# Patient Record
Sex: MALE | Race: BLACK OR AFRICAN AMERICAN | Employment: UNEMPLOYED | URBAN - METROPOLITAN AREA
[De-identification: names, ages, dates, MRNs, and addresses within clinical notes are randomized per-mention and may not be internally consistent; named-entity substitution may affect disease eponyms.]

---

## 2020-01-13 PROBLEM — F17.200 SMOKER: Status: ACTIVE | Noted: 2020-01-13

## 2020-01-13 PROBLEM — C61 MALIGNANT TUMOR OF PROSTATE (HCC): Status: ACTIVE | Noted: 2019-11-21

## 2020-01-13 PROBLEM — N45.3 ORCHITIS AND EPIDIDYMITIS: Status: ACTIVE | Noted: 2020-01-13

## 2020-01-13 PROBLEM — I10 ESSENTIAL HYPERTENSION: Status: ACTIVE | Noted: 2017-04-18

## 2022-07-06 ENCOUNTER — OFFICE VISIT (OUTPATIENT)
Dept: ORTHOPEDIC SURGERY | Age: 62
End: 2022-07-06
Payer: MEDICAID

## 2022-07-06 VITALS
HEIGHT: 67 IN | TEMPERATURE: 97.9 F | BODY MASS INDEX: 22.76 KG/M2 | OXYGEN SATURATION: 98 % | HEART RATE: 81 BPM | WEIGHT: 145 LBS

## 2022-07-06 DIAGNOSIS — M54.41 CHRONIC BILATERAL LOW BACK PAIN WITH BILATERAL SCIATICA: Primary | ICD-10-CM

## 2022-07-06 DIAGNOSIS — M54.42 CHRONIC BILATERAL LOW BACK PAIN WITH BILATERAL SCIATICA: Primary | ICD-10-CM

## 2022-07-06 DIAGNOSIS — G89.29 CHRONIC BILATERAL LOW BACK PAIN WITH BILATERAL SCIATICA: Primary | ICD-10-CM

## 2022-07-06 PROCEDURE — 99204 OFFICE O/P NEW MOD 45 MIN: CPT | Performed by: PHYSICAL MEDICINE & REHABILITATION

## 2022-07-06 RX ORDER — METHOCARBAMOL 500 MG/1
TABLET, FILM COATED ORAL
COMMUNITY
End: 2022-07-06

## 2022-07-06 RX ORDER — AMLODIPINE BESYLATE 5 MG/1
TABLET ORAL
COMMUNITY
Start: 2022-07-05

## 2022-07-06 RX ORDER — DICLOFENAC SODIUM 10 MG/G
GEL TOPICAL
COMMUNITY
Start: 2022-07-05 | End: 2022-08-04

## 2022-07-06 NOTE — PROGRESS NOTES
[address]  [address], [address]  [place], [address] [place] [address] Street  Phone: [phone]  Fax: [phone]      Patient: Kathrine Edwards                                                                              MRN: [id number]        YOB: 1960          AGE: 62 y.o. PCP: Barbara Adam MD  Date:  07/06/22    Reason for Consultation: Back Pain and Neck Pain      HPI:  Kathrine Edwards is a 62 y.o. male with relevant PMH of prostate cancer (holistic treatment per patients request) , HTN who presents with low back pain. The pain began in 1997 after an injury carrying heavy material up stairs, felt pain in the right hip hip and weakness in the right leg. At the time did physical therapy. Over the years has had intermittent pain in the low back and right hip. 1.5 years ago pain worsened. He was seen by Dr. Meet Nagel and tried epidural injections which he found helpful. Neurologic symptoms: No numbness, tingling, weakness, bowel or bladder changes. Reports a fall 1/2022. Ambulates with cane      Location: The pain is located in the low back, buttock, right lateral hip   Radiation: The pain does radiate down the right leg . Pain Score: Currently: 2/10  At Best: 0/10  At Worst: 10/10   Quality: Pain is of a Achy, Dull and Tight quality. Aggravating: Pain is exacerbated by unclear  Alleviating: The pain is alleviated by lying down    Prior Treatments:  Physical therapy: None recently, last in [facility] - series of 3 injections helped   Chiropractic treatment  Previous Medications: Aleve prn  Current Medications:  Previous work-up has included:   MRI cervical spine 2020  C1-2:  Within normal limits. C2-3:  Normal disc height and hydration.  Minimal disc bulging.  Mild right facet joint arthrosis.  Anterior-posterior dimension of the thecal sac at the midline measures 11 mm. C3-4:  Mild disc narrowing and desiccation.  Mild-moderate diffuse disc bulging with accompanying osteophytes.  Severe bilateral uncovertebral joint hypertrophy.  Anterior-posterior dimension of the thecal sac at the midline measures 11 mm. C4-5:  Normal disc height.  Mild disc desiccation.  Mild bilateral uncovertebral joint hypertrophy. Anterior-posterior dimension of the thecal sac at the midline measures 10.5 mm. C5-6:  Mild disc narrowing and desiccation.  Mild left disc bulging with accompanying osteophytes.  Mild right and moderate left uncovertebral joint hypertrophy.  Moderate left neural foraminal stenosis.  Anterior-posterior dimension of the thecal sac at the midline measures 10.5 mm.     C6-7:  Moderate disc narrowing and desiccation.  Mild diffuse disc bulging.  Anterior-posterior dimension of the thecal sac at the midline measures 11 mm. C7-T1:  Normal disc height and hydration.  Moderate bilateral facet joint arthrosis with 1.5 mm anterior subluxation.  Anterior-posterior dimension of the thecal sac at the midline measures 11 mm.    2/2021- x-ray hip right   Pelvis: No evidence of acute fracture of the pelvis. The sacroiliac joints are unremarkable. The pubic symphysis is unremarkable.   No lytic or blastic focus identified. No erosions or periostitis appreciated. Hip: The femoral heads are well aligned with the osseous pelvis. No evidence of right hip fracture or dislocation.  The joint spaces are unremarkable. 2/22/21- x-ray lumbar spine  VERTEBRAE AND ALIGNMENT: There is degenerative subluxation of L4 on L5 of approximately 0.9 cm     DISC SPACES: Disc space narrowing is present at L4-5 L2-3 and L1-2 severity is moderate. PARASPINOUS SOFT TISSUES: Unremarkable. ADDITIONAL: None.      IMPRESSION     Degenerative subluxation of L4 on 5 with multilevel degenerative disc dis  Bone scan 10/14/2020    There is increased radiotracer localization seen at the cervicothoracic junction as compared to previous study nonspecific although presumably is on the basis of disc degenerative disease.  An MR examination of cervical spine is recommended to fully exclude the remote possibility of a metastatic process in this regard. Past Medical History:   Past Medical History:   Diagnosis Date    Elevated PSA     HTN (hypertension)     Orchitis and epididymitis     [facility] ([facility] [address]     Smoker     Vitamin D deficiency       Past Surgical History:   Past Surgical History:   Procedure Laterality Date    HX HERNIA REPAIR      inguinal     HX LAPAROTOMY      removal of benign tumor in abdomen       SocHx:   Social History     Tobacco Use    Smoking status: Current Every Day Smoker     Packs/day: 0.50     Years: 20.00     Pack years: 10.00     Types: Cigarettes    Smokeless tobacco: Never Used   Substance Use Topics    Alcohol use: Yes     Alcohol/week: 6.0 standard drinks     Types: 6 Standard drinks or equivalent per week      FamHx:?    Family History   Family history unknown: Yes       Current Medications:    Current Outpatient Medications   Medication Sig Dispense Refill    amLODIPine (NORVASC) 5 mg tablet       diclofenac (VOLTAREN) 1 % gel       methocarbamoL (ROBAXIN) 500 mg tablet methocarbamol 500 mg tablet   TAKE 1 TABLET BY MOUTH 2 TIMES DAILY AS NEEDED (Patient not taking: Reported on 7/6/2022)      HYDROcodone-acetaminophen (NORCO) 5-325 mg per tablet TK 1 T PO Q 4 H FOR 3 DAYS (Patient not taking: Reported on 7/6/2022)        Allergies:  No Known Allergies     Review of Systems:   Gen:    Denied fevers, chills, malaise, fatigue, weight changes   Resp: Denied shortness of breath, cough, wheezing   CVS: Denied chest pain, palpitations   : Denied urinary urgency, frequency, incontinence   GI: Denied nausea, vomiting, constipation, diarrhea   Skin: Denied rashes, wounds   Psych: Denied anxiety, depression   Vasc: Denied claudication, ulcers   Hem: Denied easy bruising/bleeding   MSK: See HPI   Neuro: See HPI         Physical Exam     Vital Signs:   Visit Vitals  Pulse 81   Temp 97.9 °F (36.6 °C) (Temporal)   Ht 5' 7\" (1.702 m)   Wt 145 lb (65.8 kg)   SpO2 98%   BMI 22.71 kg/m²      General: ??????? Well nourished and well developed male without any acute distress   Psychiatric: ?  Alert and oriented x 3 with normal mood    HEENT: ???????? Atraumatic   Respiratory:   Breathing non-labored and non dyspneic   CV: ???????????????? Peripheral pulses intact, no peripheral edema   Skin: ????????????? No rashes       Neurologic: ?? Sensation: normal and grossly intact thebilateral, lower extremity(s)  Strength: 5/5 in the bilateral, lower extremity(s)   Reflexes: reveals 2+ symmetric DTRs throughout  LE  Gait: normal    Musculoskeletal: Lumbar Exam     Inspection:   Alignment: Normal  Atrophy: None       Tenderness to Palpation:   Lumbar paraspinals Positive  Lumbar spinous processes Negative  SI Joint:  Positive  Gluteal:Negative   Greater trochanter: Negative      ROM:   Lumbar ROM: Normal  Lumbar facet loading: Negative  Hip ROM: No reproduction of pain with movement     Special Tests      Slump test: Negative  SLR: Negative  HARRIETT: Negative  FADIR: Negative  Scour:  Negative  Stinchfield: Negative  Log Roll: Negative  SI joint compression: Negative        Medical Decision Making:    Images: The imaging results as well as the actual images of the studies below were reviewed, visualized and interpreted by me. Labs: The results below were reviewed. Bone scan 2020- increased localization in cervicothoracic junction  MRI cervical spine 2020- multilevel DDD- no evidence of metastatic disease  Reviewed prior urology notes - Dr. Rachel Christianson regarding prostate cancer     Assessment:   -chronic low back pain    Plan:      -Physical therapy -  Referral to start PT  -Medications - NA patient prefers to avoid medicatons.  Counseled regarding side effects and appropriate administration of medications.    -Diagnostics/Imaging - He thinks he had an MRI of his lumbar spine at [facility]- called and did not have record of MRI. He is going to bring his MRI CD and reports from home   -Injections - Consider repeat epidural injection  -Lifestyle - Encouraged regular aerobic exercise   -Education - The patient's diagnosis, prognosis and treatment options were discussed today. All questions were answered.    F/U - in 8 weeks, if he has not had recent MRI lumbar spine would get one      [address] [facility]

## 2022-07-06 NOTE — PROGRESS NOTES
Scottie Carney presents today for   Chief Complaint   Patient presents with    Back Pain    Neck Pain       Is someone accompanying this pt? no    Is the patient using any DME equipment during OV? no    Depression Screening:  No flowsheet data found. Learning Assessment:  No flowsheet data found. Abuse Screening:  No flowsheet data found. Fall Risk  No flowsheet data found. OPIOID RISK TOOL  No flowsheet data found. Coordination of Care:  1. Have you been to the ER, urgent care clinic since your last visit? no  Hospitalized since your last visit? no    2. Have you seen or consulted any other health care providers outside of the [address] [place] [address] since your last visit? no Include any pap smears or colon screening.  no

## 2022-07-14 ENCOUNTER — HOSPITAL ENCOUNTER (OUTPATIENT)
Dept: PHYSICAL THERAPY | Age: 62
Discharge: HOME | End: 2022-07-14
Payer: MEDICAID

## 2022-07-14 PROCEDURE — 97162 PT EVAL MOD COMPLEX 30 MIN: CPT

## 2022-07-14 NOTE — THERAPY EVALUATION
[address] [place] [address] PHYSICAL THERAPY  [address] [place] [address] [address], [place], [address] - Phone: [phone]  Fax: [phone] / [facility]  Patient Name: Daniel Grayson : 1960   Medical   Diagnosis: Other low back pain [M54.59] Treatment Diagnosis: Other low back pain [M54.9]   Onset Date:      Referral Source: Antonella Swanson [place] [facility]): 2022   Prior Hospitalization: See medical history Provider #: [id number]   Prior Level of Function: Independent with ADLs, ambulation; working at [employer]   Comorbidities: untreated active prostate cancer, arthritis, HTN, asthma, insomnia, neck pain, chronic back pain, h/o abdominal surgery (hernia repair, benign stomach tumor removal)   Medications: Verified on Patient Summary List   The Plan of Care and following information is based on the information from the initial evaluation.   ===========================================================================================  Assessment / key information:  Patient is a 62 y.o. male who presents with complaints of chronic LBP (10/10 at worst) with radicular symptoms, which began in . Patient reports that symptoms were primarily right-sided initially, but have been left-sided over the past couple of years. Patient demonstrates impaired posture, pain with L/S ROM, decreased core/LE strength, decreased position/activity tolerance and impaired functional mobility/gait. Patient would benefit from skilled PT services to address these issues and improve function.   Thank you for this referral.    L/S Active Movements:   ROM % AROM % PROM Comments:pain, area   Forward flexion 40-60 WFL   LBP, worse with return to standing from flexion than with flexion   Extension 20-30 WFL   NE   SB right 20-30 WFL   Left LBP,    SB left 20-30 WFL   Left LBP (worse than with right SB)   Rotation right 5-10 WFL   Left LBP Rotation left 5-10 WFL   NE      Strength    L(0-5) R (0-5) N/T   Hip Flexion (L1,2) 4- 4 []?    Knee Extension (L3,4) 5 4 []?    Ankle Dorsiflexion (L4) 4 4 []?    Great Toe Extension (L5) 4 4 []?    Ankle Plantarflexion (S1) 4 4 []?    Knee Flexion (S1,2) 5 4 []?    Upper Abdominals     [x]?     Lower Abdominals     [x]?    Paraspinals     [x]?     Back Rotators     [x]?    Gluteus Jerome 4- 4 []?    Other - Gluteus Medius 5 4- []?       FOTO: 48/100, stage 3, moderate difficulty performing usual work or household activities and hobbies  ==========================================================================================  Eval Complexity: History: HIGH Complexity :3+ comorbidities / personal factors will impact the outcome/ POC Exam:HIGH Complexity : 4+ Standardized tests and measures addressing body structure, function, activity limitation and / or participation in recreation  Presentation: MEDIUM Complexity : Evolving with changing characteristics  Clinical Decision Making:MEDIUM Complexity : FOTO score of 26-74Overall Complexity:MEDIUM    Problem List: pain affecting function, decrease ROM, decrease strength, impaired gait/ balance, decrease ADL/ functional abilitiies, decrease activity tolerance, decrease flexibility/ joint mobility and decrease transfer abilities   Treatment Plan may include any combination of the following: Therapeutic exercise, Therapeutic activities, Neuromuscular re-education, Physical agent/modality, Gait/balance training, Manual therapy, Patient education, Self Care training, Functional mobility training, Home safety training and Stair training  Patient / Family readiness to learn indicated by: asking questions, trying to perform skills and interest  Persons(s) to be included in education: patient (P)  Barriers to Learning/Limitations: None  Measures taken:    Patient Goal (s): \"To be pain free. \"   Patient self reported health status: fair  Rehabilitation Potential: fair   Short Term Goals: To be accomplished in  3-4  weeks:  1. Patient will demonstrate compliance with HEP. 2. Patient will report 7/10 pain at worst to allow improved activity tolerance. 3. Patient will demonstrate 4/5 B hip flexion strength to facilitate normal gait pattern.  Long Term Goals: To be accomplished in  6-8  weeks:  1. Patient will demonstrate independence with HEP. 2. Patient will report 4/10 pain at worst to allow improved activity tolerance. 3. Patient will demonstrate 4/5 B hip extension strength to facilitate normal gait pattern. 4. Patient will score greater than or equal to 55/100 on FOTO, indicating improved function. Frequency / Duration:   Patient to be seen  1-2  times per week for 6-8  weeks:  Patient / Caregiver education and instruction: activity modification    Therapist Signature: Nathan Dubin, PT Date: 7/41/2290   Certification Period: NA Time: 10:22 AM   ===========================================================================================    To ensure your patient receives the highest quality care and to avoid disruption in therapy please sign and return this plan of care within 21 days. Per Medicaid guidelines if the plan of care is not received within 21 days the patient's care must be put on hold until signed. Per Medicaid guidelines, plan of care must be signed by physician. I certify that the above Physical Therapy Services are being furnished while the patient is under my care. I agree with the treatment plan and certify that this therapy is necessary. Physician Signature:        Date:       Time:     Andriy Landon*  Please sign and return to [facility] [employer] or you may fax the signed copy to [phone]. Thank you.
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
## 2022-07-18 ENCOUNTER — HOSPITAL ENCOUNTER (OUTPATIENT)
Dept: PHYSICAL THERAPY | Age: 62
Discharge: HOME | End: 2022-07-18
Payer: MEDICAID

## 2022-07-18 ENCOUNTER — APPOINTMENT (OUTPATIENT)
Dept: PHYSICAL THERAPY | Age: 62
End: 2022-07-18
Payer: MEDICAID

## 2022-07-18 PROCEDURE — 97116 GAIT TRAINING THERAPY: CPT

## 2022-07-18 PROCEDURE — 97110 THERAPEUTIC EXERCISES: CPT

## 2022-07-18 PROCEDURE — 97530 THERAPEUTIC ACTIVITIES: CPT

## 2022-07-18 PROCEDURE — 97112 NEUROMUSCULAR REEDUCATION: CPT

## 2022-07-18 NOTE — PROGRESS NOTES
PT DAILY TREATMENT NOTE     Patient Name: Kathrine Edwards  Date:2022  : 1960  [x]  Patient  Verified  Payor: Madeleine Reyes / Plan: [address] [place] [address] / Product Type: Managed Care Medicaid /    In time: 10:11  Out time: 11:20  Total Treatment Time (min): 69  Visit #: 2 of 6-16    Treatment Area: Other low back pain [M54.59]    SUBJECTIVE  Pain Level (0-10 scale): 4-5/10  Any medication changes, allergies to medications, adverse drug reactions, diagnosis change, or new procedure performed?: [x] No    [] Yes (see summary sheet for update)  Subjective functional status/changes:   [] No changes reported  The pain is in the middle of my back, points towards lumbar region. \"The pain is not consistent\"  The left side of my back (pelvis) is usually higher than the right. OBJECTIVE  Modality rationale: decrease edema, decrease inflammation, decrease pain and increase tissue extensibility to improve the patients ability to improve QOL.    Min Type Additional Details    [] Estim: []Att   []Unatt  []TENS instruct                 []IFC  []Premod []NMES                       []Other:  []w/US   []w/ice   []w/heat  Position:  Location:    []  Traction: [] Cervical       []Lumbar                       [] Prone          []Supine                       []Intermittent   []Continuous Lbs:  [] before manual  [] after manual    []  Ultrasound: []Continuous   [] Pulsed                           []1MHz   []3MHz Location:  W/cm2:    []  Iontophoresis with dexamethasone         Location: [] Take home patch   [] In clinic   10 [x]  Ice     []  heat  []  Ice massage Position: prone  Location: across L/spine    []  Vasopneumatic Device:  If using vaso (only need to measure limb vaso being performed on)      pre-treatment girth :       post-treatment girth :       measured at (landmark location)  Pressure: [] lo [] med [] hi   Temp: [] lo [] med [] hi   [] Skin assessment post-treatment: []intact []redness- no adverse reaction       []redness - adverse reaction:     17 min Therapeutic Exercise:  [x] See flow sheet :   Rationale: increase ROM, increase strength and improve coordination to improve the patients ability to safely ambulate with in the community. 10 min Therapeutic Activity:  [x]  See flow sheet :   Rationale: increase ROM, increase strength, improve coordination, improve balance and increase proprioception to improve the patients ability to increase QOL. 17 min Neuromuscular Re-education:  [x]  See flow sheet :   Rationale: increase ROM, increase strength, improve coordination, improve balance and increase proprioception  to improve the patients ability to safely ambulate in community settings and to decrease risk of falls. 8 min Gait Training: [x]  See flow sheet : further gait training held d/t c/o dizziness and unsteadiness, pain with side step and standing hip 3-way   Rationale: Increase strength and and proprioception to increase safe ambulation in community settings. 7  NC min Manual Therapy: MET with isacc used to re-align pelvis   Rationale: decrease pain, increase ROM, increase tissue extensibility and increase postural awareness to maintain proper alignment of pelvis.    [The manual therapy interventions were performed at a separate and distinct time from the therapeutic activities interventions]         min Patient Education: [x] Review HEP    [] Progressed/Changed HEP based on:   [x] positioning   [x] body mechanics   [] transfers   [] heat/ice application        Other Objective/Functional Measures:  See flow sheet   L ilium presents posteriorly rotated in prone with L PSIS     Pain Level (0-10 scale) post treatment: 1/10    ASSESSMENT/Changes in Function: Pt requires max VCs, TCs, and visual cues during all there-ex per flow sheet during initial session after eval.  Pt demonstrates pain during turning from prone to supine, Pt has c/o unsteadiness and dizziness after getting up from plenth post supine exercises, further gait training is held during this visit. Pt presents with decreased posteriorly rotated L ilium post MET. Pt would benefit from TA strengthening to improve truncal stability during transfers. Patient will continue to benefit from skilled PT services to modify and progress therapeutic interventions, address functional mobility deficits, address ROM deficits, address strength deficits, analyze and address soft tissue restrictions, analyze and cue movement patterns, analyze and modify body mechanics/ergonomics, assess and modify postural abnormalities, address imbalance/dizziness and instruct in home and community integration to attain remaining goals. []  See Plan of Care  []  See progress note/recertification  []  See Discharge Summary         Progress towards goals / Updated goals: · Short Term Goals: To be accomplished in  3-4  weeks:  1. Patient will demonstrate compliance with HEP. 2. Patient will report 7/10 pain at worst to allow improved activity tolerance. 3. Patient will demonstrate 4/5 B hip flexion strength to facilitate normal gait pattern. · Long Term Goals: To be accomplished in  6-8  weeks:  1. Patient will demonstrate independence with HEP. 2. Patient will report 4/10 pain at worst to allow improved activity tolerance. 3. Patient will demonstrate 4/5 B hip extension strength to facilitate normal gait pattern. 4. Patient will score greater than or equal to 55/100 on FOTO, indicating improved function.     PLAN  []  Upgrade activities as tolerated     []  Continue plan of care  []  Update interventions per flow sheet       []  Discharge due to:_  []  Other:_      Rubina Castillo PTA 7/18/2022  9:30 AM    Future Appointments   Date Time Provider Brian Mascorro   7/18/2022 10:15 AM [facility] PT [address] [facility]   7/20/2022 10:15 AM [facility] PT [address] [facility]   7/25/2022 11:45 AM Tanya Fishman PTA [facility] 7/26/2022 12:30 PM Raymond Bruno PTA [facility]   8/4/2022  2:00 PM Boogie Stahl [facility]   8/22/2022  8:45 AM Antonella Swanson MD [facility] BS AMB

## 2022-07-20 ENCOUNTER — HOSPITAL ENCOUNTER (OUTPATIENT)
Dept: PHYSICAL THERAPY | Age: 62
Discharge: HOME | End: 2022-07-20
Payer: MEDICAID

## 2022-07-20 PROCEDURE — 97116 GAIT TRAINING THERAPY: CPT

## 2022-07-20 PROCEDURE — 97110 THERAPEUTIC EXERCISES: CPT

## 2022-07-20 PROCEDURE — 97530 THERAPEUTIC ACTIVITIES: CPT

## 2022-07-20 PROCEDURE — 97112 NEUROMUSCULAR REEDUCATION: CPT

## 2022-07-20 NOTE — PROGRESS NOTES
PHYSICAL THERAPY - DAILY TREATMENT NOTE    Patient Name: Xavier Thomas        Date: 2022  : 1960   YES Patient  Verified  Visit #:   3   of   16  Insurance: Payor: Cuba Guevara / Plan: [address] [place] [address] / Product Type: Managed Care Medicaid /      In time: 10:05early Out time: 11:11   Total Treatment Time: 66       TREATMENT AREA = Other low back pain [M54.59]    SUBJECTIVE    Pain Level (on 0 to 10 scale):  2  / 10   Medication Changes/New allergies or changes in medical history, any new surgeries or procedures? NO    If yes, update Summary List   Subjective Functional Status/Changes:  []  No changes reported     \"I liked the (manual with stick) we did last time. That felt great. I was hurting some this past week but when I did my homework it felt better\"          OBJECTIVE     Therapeutic Procedures:  Min Procedure Specifics + Rationale   n/a [x]  Patient Education (performed throughout session) [x] Review HEP    [] Progressed/Changed HEP based on:   [] proper performance and advancement of Therex/TA   [] reduction in pain level    [] increased functional capacity       [] change in directional preference   20 [x] Therapeutic Exercise   [x]  See Flowsheet   Rationale: increase ROM and increase strength to improve the patients ability to participate in ADL's    8 [x]  Gait Training         Rationale: Normalize gait, increase proprioceptive and kinesthetic awareness, coordination, balance   8 [x] Therapeutic Activity   [x]  See Flowsheet    Rationale:  To improve safety, proprioception, coordination, and efficiency with tasks   20 [x] Neuromuscular Re-ed   [x]  See Flowsheet    Rationale: increase ROM, increase strength, improve coordination, improve balance and increase proprioception  to improve the patients ability to safely participate in ADL's     Modality rationale: decrease inflammation, decrease pain, increase tissue extensibility and increase muscle contraction/control to improve the patients ability to perform ADL's with greater ease     Min Type Additional Details   10 [x]  Cold Pack   [] pre-CHENCHO      [x] post-CHENCHO  []  Ice massage Location:L/S    [] supine             [x] prone  [] legs elevated  [x] legs flat  [] sitting              [] sidelying - [] left [] right   [x] Skin assessment post-treatment:  [x]intact [x]redness- no adverse reaction       []redness - adverse reaction:     Other Objective/Functional Measures:    Increased reps/sets/resistance per flow sheet. Post Treatment Pain Level (on 0 to 10) scale:   1  / 10     ASSESSMENT    Assessment/Changes in Function:       Notable gluteal weakness with therex progression         Patient will continue to benefit from skilled PT services to modify and progress therapeutic interventions, address functional mobility deficits, address ROM deficits, address strength deficits, analyze and address soft tissue restrictions, analyze and cue movement patterns, analyze and modify body mechanics/ergonomics, and instruct in home and community integration  to attain remaining goals   Progress toward goals / Updated goals:    Fair progress in functional strength vs pain     PLAN    [x]  Upgrade activities as tolerated  [x]  Update interventions per flow sheet YES Continue plan of care   []  Discharge due to :    []  Other:      Therapist: Brigida Adam \"BJ\" Liza Ordonez, DPT, Cert. MDT, Cert. DN, Cert. SMT, Dip.  Osteopractic    Date: 7/20/2022 Time: 10:22 AM     Future Appointments   Date Time Provider Brian Mascorro   7/25/2022 11:45 AM Luisa Raymond [facility]   7/26/2022 12:30 PM Julito Wiley PTA [facility]   8/4/2022  2:00 PM Jailene Stahl, [address] [facility] SCHED   8/22/2022  8:45 AM Luis Fernando Saldaña MD [facility] BS AMB

## 2022-07-25 ENCOUNTER — HOSPITAL ENCOUNTER (OUTPATIENT)
Dept: PHYSICAL THERAPY | Age: 62
Discharge: HOME | End: 2022-07-25
Payer: MEDICAID

## 2022-07-25 PROCEDURE — 97530 THERAPEUTIC ACTIVITIES: CPT

## 2022-07-25 PROCEDURE — 97116 GAIT TRAINING THERAPY: CPT

## 2022-07-25 PROCEDURE — 97110 THERAPEUTIC EXERCISES: CPT

## 2022-07-25 PROCEDURE — 97112 NEUROMUSCULAR REEDUCATION: CPT

## 2022-07-25 NOTE — PROGRESS NOTES
PHYSICAL THERAPY - DAILY TREATMENT NOTE    Patient Name: Codi Hernandez        Date: 2022  : 1960   YES Patient  Verified  Visit #:   4   of   16  Insurance: Payor: Linda Kidney / Plan: 1 [facility] / Product Type: Managed Care Medicaid /      In time: 10:15 Out time: 11:05   Total Treatment Time: 50     Medicare/BCBS [place] Time Tracking (below)   Total Timed Codes (min):  50 1:1 Treatment Time:  40     TREATMENT AREA =  Other low back pain [M54.59]    SUBJECTIVE    Pain Level (on 0 to 10 scale):  1  / 10   Medication Changes/New allergies or changes in medical history, any new surgeries or procedures? NO    If yes, update Summary List   Subjective Functional Status/Changes:  []  No changes reported     Pt reports no new complaints, \"I feel like I'm getting better already. \"           OBJECTIVE    12 min Therapeutic Exercise:  [x]  See flow sheet   Rationale:      increase ROM and increase strength to improve the patients ability to perform ADL's with greater ease. 8 min Therapeutic Activity: See flow sheet   Rationale:   increase mobility, endurance, and strength to improve patient's ability to complete functional tasks with greater ease. 12 min Neuromuscular Re-ed: See flow sheet   Rationale:   improve balance, coordination, proprioception to improve patient's ability to complete functional tasks safely. 8 [x]  Gait Training         Rationale: Normalize gait, increase proprioceptive and kinesthetic awareness, coordination, balance       Modalities Rationale:    decrease pain and increase tissue extensibility to improve patient's ability to complete functional tasks with less pain.    10 min [x]  Ice     []  Heat    location/position: Lower back post tx supine    [x] Skin assessment post-treatment (if applicable):    [x]  intact    []  redness- no adverse reaction     []redness - adverse reaction:       min Patient Education:  YES  Reviewed HEP []  Progressed/Changed HEP based on:   Cont with HEP     Other Objective/Functional Measures:    Ms fatigue with leg press  Bird dogs included to address lumbar/core stabilization  Pt was able to feel more of a stretch in HS in 1/2 moon position      Post Treatment Pain Level (on 0 to 10) scale:   1 / 10     ASSESSMENT    Assessment/Changes in Function:     Pt appeared challenged with progression of activities today, no pain reproduction. Impaired lumbar core stability as seen with bird dogs and marches on swiss ball although improvement observed with repetition. Would benefit from further strength/stability exercises       []  See Progress Note/Recertification   Patient will continue to benefit from skilled PT services to analyze, cue, progress, modify,, demonstrate, instruct, and address, movement patterns, therapeutic interventions, postural abnormalities, soft tissue restrictions, ROM, strength, functional mobility, body mechanics/ergonomics, and home and community integration, to attain remaining goals. Progress toward goals / Updated goals:    Short Term Goals: To be accomplished in  3-4  weeks:  1. Patient will demonstrate compliance with HEP. 2. Patient will report 7/10 pain at worst to allow improved activity tolerance. 3. Patient will demonstrate 4/5 B hip flexion strength to facilitate normal gait pattern.        PLAN    []  Upgrade activities as tolerated YES Continue plan of care   []  Discharge due to :    []  Other:      Therapist: Minerva Box PT, DPT    Date: 7/25/2022 Time: 10:10 AM     Future Appointments   Date Time Provider Brian Mascorro   7/25/2022 10:15 AM Italo Chamberlain PT [facility]   7/26/2022 12:30 PM Fabrice Alcantara PTA [facility]   8/4/2022  2:00 PM Jailene Stahl, [address] [facility] SCHED   8/22/2022  8:45 AM Eriberto Bishop MD [facility] BS AMB

## 2022-07-26 ENCOUNTER — HOSPITAL ENCOUNTER (OUTPATIENT)
Dept: PHYSICAL THERAPY | Age: 62
Discharge: HOME | End: 2022-07-26
Payer: MEDICAID

## 2022-07-26 PROCEDURE — 97112 NEUROMUSCULAR REEDUCATION: CPT

## 2022-07-26 PROCEDURE — 97116 GAIT TRAINING THERAPY: CPT

## 2022-07-26 PROCEDURE — 97530 THERAPEUTIC ACTIVITIES: CPT

## 2022-07-26 PROCEDURE — 97110 THERAPEUTIC EXERCISES: CPT

## 2022-07-26 NOTE — PROGRESS NOTES
PHYSICAL THERAPY - DAILY TREATMENT NOTE    Patient Name: Odilon Adjutant        Date: 2022  : 1960   yes Patient  Verified  Visit #:   5     Insurance: Payor: Corinne Meadows / Plan: 1 [facility] 270 / Product Type: Managed Care Medicaid /      In time: 1173 Out time: 140   Total Treatment Time: 67       TREATMENT AREA =  Other low back pain [M54.59]    SUBJECTIVE  Pain Level (on 0 to 10 scale):  3  / 10   Medication Changes/New allergies or changes in medical history, any new surgeries or procedures?    no  If yes, update Summary List   Subjective Functional Status/Changes:  []  No changes reported     \"I am sore from yesterday, in the hips the most\"          OBJECTIVE  Modalities Rationale:     decrease inflammation and decrease pain to improve patient's ability to safely perform ADLs, bending/stooping/ lifting; perform prolong sitting/standing/ambulation; and negotiate stairs with no pain or limitations      min [] Estim, type/location:                                      []  att     []  unatt     []  w/US     []  w/ice    []  w/heat    min []  Mechanical Traction: type/lbs                   []  pro   []  sup   []  int   []  cont    []  before manual    []  after manual    min []  Ultrasound, settings/location:      min []  Iontophoresis w/ dexamethasone, location:                                               []  take home patch       []  in clinic   10 min [x]  Ice     []  Heat    location/position: prone    [x]Skin assessment post-treatment (if applicable):   [x]  intact    []  redness- no adverse reaction                  []redness - adverse reaction:      22 min Therapeutic Exercise:  [x]  See flow sheet   Rationale:      increase ROM, increase strength, and improve coordination to improve the patients ability to  safely perform ADLs/transfers/squatting/prolong stding and ambulation/stair negotiation with minimal or no c/o pain     10 min Therapeutic Activity: [x]  See flow sheet   Rationale:    increase ROM, increase strength, and improve coordination to improve the patients ability to  safely perform ADLs/transfers/squatting/prolong stding and ambulation/stair negotiation with minimal or no c/o pain      15 min Neuromuscular Re-ed: [x]  See flow sheet   Rationale:    increase ROM, increase strength, and improve coordination to improve the patients ability to  safely perform ADLs/transfers/squatting/prolong stding and ambulation/stair negotiation with minimal or no c/o pain    10 min Gait Training:  (I) for HK amb x 60' with 5# KB  (I) SS x 30' x 2 with YTB     Rationale: To improve ambulation safety and efficiency in order to improve patient's ability to safely ambulate at home for self care.     Billed With/As:   [] TE   [] TA   [] Neuro   [] Self Care Patient Education: [x] Review HEP    [] Progressed/Changed HEP based on:   [] positioning   [] body mechanics   [] transfers   [] heat/ice application    [] other:      Other Objective/Functional Measures:    VCs + demo to perform proper technique and for safety with TE  initiated 5# with HK amb and bridges with no c/o p!  demos 1 LOB with HK amb with (I) to recover  c/o glut med fatigue with SS      Post Treatment Pain Level (on 0 to 10) scale:   1  / 10     ASSESSMENT  Assessment/Changes in Function:   Progressed TE without c/o increase p!  demos symmetrical WBIng with sit <>stand without UE asisst  demos fair bridge form with no pain    []  See Progress Note/Recertification   Patient will continue to benefit from skilled PT services to modify and progress therapeutic interventions, address functional mobility deficits, address ROM deficits, address strength deficits, analyze and address soft tissue restrictions, analyze and cue movement patterns, analyze and modify body mechanics/ergonomics, assess and modify postural abnormalities, and instruct in home and community integration to attain remaining goals. Progress toward goals / Updated goals:  Short Term Goals: To be accomplished in  3-4  weeks:  1. Patient will demonstrate compliance with HEP. 2. Patient will report 7/10 pain at worst to allow improved activity tolerance. 3. Patient will demonstrate 4/5 B hip flexion strength to facilitate normal gait pattern. Long Term Goals: To be accomplished in  6-8  weeks:  1. Patient will demonstrate independence with HEP. 2. Patient will report 4/10 pain at worst to allow improved activity tolerance. 3. Patient will demonstrate 4/5 B hip extension strength to facilitate normal gait pattern. 4. Patient will score greater than or equal to 55/100 on FOTO, indicating improved function.      PLAN  [x]  Upgrade activities as tolerated yes Continue plan of care   []  Discharge due to :    []  Other:      Therapist: David Dickerson PTA    Date: 7/26/2022 Time: 3:09 PM     Future Appointments   Date Time Provider Brian Mascorro   8/4/2022  2:00 PM Dakota Dent, [address] [place] [address]   8/22/2022  8:45 AM Ailyn Reis MD [facility] BS AMB

## 2022-08-02 ENCOUNTER — HOSPITAL ENCOUNTER (OUTPATIENT)
Dept: PHYSICAL THERAPY | Age: 62
Discharge: HOME | End: 2022-08-02
Payer: MEDICAID

## 2022-08-02 PROCEDURE — 97530 THERAPEUTIC ACTIVITIES: CPT

## 2022-08-02 PROCEDURE — 97116 GAIT TRAINING THERAPY: CPT

## 2022-08-02 PROCEDURE — 97112 NEUROMUSCULAR REEDUCATION: CPT

## 2022-08-02 PROCEDURE — 97110 THERAPEUTIC EXERCISES: CPT

## 2022-08-02 NOTE — PROGRESS NOTES
PHYSICAL THERAPY - DAILY TREATMENT NOTE    Patient Name: Khadra Roth        Date: 2022  : 1960   YES Patient  Verified  Visit #:   6     Insurance: Payor: Jacques Ortega / Plan: 1 [facility] 270 / Product Type: Managed Care Medicaid /      In time: 10:50 Out time: 11:50   Total Treatment Time: 60       TREATMENT AREA = Other low back pain [M54.59]    SUBJECTIVE    Pain Level (on 0 to 10 scale):  2  / 10   Medication Changes/New allergies or changes in medical history, any new surgeries or procedures? NO    If yes, update Summary List   Subjective Functional Status/Changes:  []  No changes reported     \"It's on and off. But it's better\"          OBJECTIVE     Therapeutic Procedures:  Min Procedure Specifics + Rationale   n/a [x]  Patient Education (performed throughout session) [x] Review HEP    [] Progressed/Changed HEP based on:   [] proper performance and advancement of Therex/TA   [] reduction in pain level    [] increased functional capacity       [] change in directional preference   17 [x] Therapeutic Exercise   [x]  See Flowsheet   Rationale: increase ROM and increase strength to improve the patients ability to participate in ADL's    15 [x] Therapeutic Activity   [x]  See Flowsheet    Rationale:  To improve safety, proprioception, coordination, and efficiency with tasks   10 [x] Neuromuscular Re-ed   [x]  See Flowsheet    Rationale: increase ROM, increase strength, improve coordination, improve balance and increase proprioception  to improve the patients ability to safely participate in ADL's     8 [x]  Gait Training   Rationale: Normalize gait, increase proprioceptive and kinesthetic awareness, coordination, balance     Modality rationale: decrease inflammation, decrease pain, increase tissue extensibility and increase muscle contraction/control to improve the patients ability to perform ADL's with greater ease     Min Type Additional Details   10 [x]  Cold Pack   [] pre-CHENCHO      [x] post-CHENCHO  []  Ice massage Location:L/S    [] supine             [x] prone  [] legs elevated  [] legs flat  [] sitting              [] sidelying - [] left [] right   [x] Skin assessment post-treatment:  [x]intact [x]redness- no adverse reaction       []redness - adverse reaction:       Other Objective/Functional Measures:    Increased reps/sets/resistance per flow sheet. Post Treatment Pain Level (on 0 to 10) scale:   2  / 10     ASSESSMENT    Assessment/Changes in Function:       Performed/participated in treatment well without complaints aside from muscular fatigue/deconditioning, indicating (+) response to current course of treatment to further improve functional status. Patient will continue to benefit from skilled PT services to modify and progress therapeutic interventions, address functional mobility deficits, address ROM deficits, address strength deficits, analyze and address soft tissue restrictions, analyze and cue movement patterns, analyze and modify body mechanics/ergonomics, and instruct in home and community integration  to attain remaining goals   Progress toward goals / Updated goals:    Performs familiar therex well to allow for establishing independence with HEP     PLAN    [x]  Upgrade activities as tolerated  [x]  Update interventions per flow sheet YES Continue plan of care   []  Discharge due to :    []  Other:      Therapist: Jessica Bryant \"BJ\" Nitin Olea, MARISOL, Cert. MDT, Cert. DN, Cert. SMT, Dip.  Osteopractic    Date: 8/2/2022 Time: 11:49 AM     Future Appointments   Date Time Provider Brian Subha   8/4/2022  2:00 PM Mira Hyman, [address] [place] [address]   8/5/2022  8:45 AM Eugene Ortega PTA [facility]   8/9/2022  9:30 AM [facility] PT [address] [facility]   8/11/2022  9:30 AM Tanya Fishman PTA [facility]   8/22/2022  8:45 AM Sebastián Landon MD [facility] BS AMB

## 2022-08-05 ENCOUNTER — HOSPITAL ENCOUNTER (OUTPATIENT)
Dept: PHYSICAL THERAPY | Age: 62
Discharge: HOME | End: 2022-08-05
Payer: MEDICAID

## 2022-08-05 PROCEDURE — 97116 GAIT TRAINING THERAPY: CPT

## 2022-08-05 PROCEDURE — 97112 NEUROMUSCULAR REEDUCATION: CPT

## 2022-08-05 PROCEDURE — 97110 THERAPEUTIC EXERCISES: CPT

## 2022-08-05 PROCEDURE — 97530 THERAPEUTIC ACTIVITIES: CPT

## 2022-08-05 NOTE — PROGRESS NOTES
PHYSICAL THERAPY - DAILY TREATMENT NOTE    Patient Name: Iman Damon        Date: 2022  : 1960   yes Patient  Verified  Visit #:   7    Insurance: Payor: Chad Bergman / Plan: 1 [facility] 270 / Product Type: Managed Care Medicaid /      In time: 694 Out time: 945   Total Treatment Time: 57       TREATMENT AREA =  Other low back pain [M54.59]    SUBJECTIVE  Pain Level (on 0 to 10 scale):  2  / 10   Medication Changes/New allergies or changes in medical history, any new surgeries or procedures?    no  If yes, update Summary List   Subjective Functional Status/Changes:  []  No changes reported     \"I feel sore on and off. Its not bad now. I feel it in the hips so much with the band TE\"         OBJECTIVE  Modalities Rationale:   PD  20 min Therapeutic Exercise:  [x]  See flow sheet   Rationale:      increase ROM, increase strength, and improve coordination to improve the patients ability to  safely perform ADLs/transfers/squatting/prolong stding and ambulation/stair negotiation with minimal or no c/o pain     10 min Therapeutic Activity: [x]  See flow sheet   Rationale:    increase ROM, increase strength, and improve coordination to improve the patients ability to  safely perform ADLs/transfers/squatting/prolong stding and ambulation/stair negotiation with minimal or no c/o pain      15 min Neuromuscular Re-ed: [x]  See flow sheet   Rationale:    increase ROM, increase strength, and improve coordination to improve the patients ability to  safely perform ADLs/transfers/squatting/prolong stding and ambulation/stair negotiation with minimal or no c/o pain    12 min Gait Training:  (I) for HK amb x 60' with 10# KB  (I) SS x 30' x 2 with YTB  (I) with suitcase carry x 60' ea x 2 UE with 10#    Rationale: To improve ambulation safety and efficiency in order to improve patient's ability to safely ambulate at home for self care.     Billed With/As:   [x] TE   [x] TA   [x] Neuro   [] Self Care Patient Education: [x] Review HEP    [] Progressed/Changed HEP based on:   [x] positioning   [x] body mechanics   [x] transfers   [] heat/ice application    [x] other: Pt ed on importance and benefits of compliance with HEP, core strength/stability and proper posture; pt verbalized understanding       Other Objective/Functional Measures:    VCs + demo to perform proper technique and for safety with TE    initiated suitcase carry and AE with hip 3 way, and increased reps without c/o p!    c/o fatigue with lateral amb     Post Treatment Pain Level (on 0 to 10) scale:   1  / 10     ASSESSMENT  Assessment/Changes in Function:   Progressed TE without c/o increase p! []  See Progress Note/Recertification   Patient will continue to benefit from skilled PT services to modify and progress therapeutic interventions, address functional mobility deficits, address ROM deficits, address strength deficits, analyze and address soft tissue restrictions, analyze and cue movement patterns, analyze and modify body mechanics/ergonomics, assess and modify postural abnormalities, and instruct in home and community integration to attain remaining goals. Progress toward goals / Updated goals:  Short Term Goals: To be accomplished in  3-4  weeks:  1. Patient will demonstrate compliance with HEP. MET  2. Patient will report 7/10 pain at worst to allow improved activity tolerance. 3. Patient will demonstrate 4/5 B hip flexion strength to facilitate normal gait pattern. Long Term Goals: To be accomplished in  6-8  weeks:  1. Patient will demonstrate independence with HEP. 2. Patient will report 4/10 pain at worst to allow improved activity tolerance. 3. Patient will demonstrate 4/5 B hip extension strength to facilitate normal gait pattern. 4. Patient will score greater than or equal to 55/100 on FOTO, indicating improved function.      PLAN  [x]  Upgrade activities as tolerated yes Continue plan of care []  Discharge due to :    []  Other:      Therapist: Holli Riggs PTA    Date: 8/5/2022 Time: 2:26 PM     Future Appointments   Date Time Provider Brian Mascorro   8/9/2022  9:30 AM [address] [facility] 1 [facility]   8/11/2022  9:30 AM Codi Starks PTA [facility]   8/22/2022  8:45 AM [facility]   9/14/2022 10:30 AM MD Hardik Serra   9/28/2022  3:45 PM MD Hardik Serra

## 2022-08-09 ENCOUNTER — HOSPITAL ENCOUNTER (OUTPATIENT)
Dept: PHYSICAL THERAPY | Age: 62
Discharge: HOME | End: 2022-08-09
Payer: MEDICAID

## 2022-08-09 PROCEDURE — 97530 THERAPEUTIC ACTIVITIES: CPT

## 2022-08-09 PROCEDURE — 97110 THERAPEUTIC EXERCISES: CPT

## 2022-08-09 PROCEDURE — 97116 GAIT TRAINING THERAPY: CPT

## 2022-08-09 NOTE — PROGRESS NOTES
PHYSICAL THERAPY - DAILY TREATMENT NOTE    Patient Name: Caleb Tatum        Date: 2022  : 1960   yes Patient  Verified  Visit #:   8    Insurance: Payor: Christine Escort / Plan: 1 [facility] 270 / Product Type: Managed Care Medicaid /      In time: 3271 Out time: 1020    Total Treatment Time: 48        TREATMENT AREA =  Other low back pain [M54.59]    SUBJECTIVE  Pain Level (on 0 to 10 scale): 0  / 10 \"I am a little sore because I had a chiropractic adjustment yesterday\"   Medication Changes/New allergies or changes in medical history, any new surgeries or procedures?    no  If yes, update Summary List   Subjective Functional Status/Changes:  []  No changes reported   Pt reported no adverse responses to the last tx session. Notes that he is also seeing chiropractor and had an adjustment yesterday.       OBJECTIVE  Modalities Rationale:   PD  20 min Therapeutic Exercise:  [x]  See flow sheet   Rationale:      increase ROM, increase strength, and improve coordination to improve the patients ability to  safely perform ADLs/transfers/squatting/prolong stding and ambulation/stair negotiation with minimal or no c/o pain      13 min Therapeutic Activity: [x]  See flow sheet   Rationale:    increase ROM, increase strength, and improve coordination to improve the patients ability to  safely perform ADLs/transfers/squatting/prolong stding and ambulation/stair negotiation with minimal or no c/o pain      x min Neuromuscular Re-ed: [x]  See flow sheet   Rationale:    increase ROM, increase strength, and improve coordination to improve the patients ability to  safely perform ADLs/transfers/squatting/prolong stding and ambulation/stair negotiation with minimal or no c/o pain    15 min Gait Training:    (I) SS x 30' x 2 with GTB  (I) Retro walking: 10#, 30'x2  (I) monster walk: 30'x2 w/ GTB  (I) suitcase carry: [address] [address] x 2 w/ 10# BUE  (I) suitcase carry : [address] [address] x 2 w/ 10# in one hand at a time  (I) HK 30'x 2 w/ 15# KB   Rationale: To improve ambulation safety and efficiency in order to improve patient's ability to safely ambulate at home for self care. Billed With/As:   [x] TE   [x] TA   [x] Neuro   [] Self Care Patient Education: [x] Review HEP    [] Progressed/Changed HEP based on:   [] positioning   [x] body mechanics   [] transfers   [] heat/ice application    [x] other: discussed potential membership at the [facility] to keep up overall wellness, reviewed intent of activity progression and potential for [facility] w/ activity. Other Objective/Functional Measures:  Progressed activity w/ increased reps/sets as tolerated per flow sheet    Increased to level 2 on the Nu-step  Added GTB to bridge for hip abd, no pain w/ bridge today     Mod tactile cues for positioning during the clam shells       Post Treatment Pain Level (on 0 to 10) scale:    1.5 / 10, \"just muscle soreness from working\"     ASSESSMENT  Assessment/Changes in Function:   Pt w/ good tolerance w/ activity progression today without reports of increased S&S. Noted to have proximal hip fatigue w/ performance of clams shells in the correct form as well as w/ monster walks, side stepping, and bridging w/ abd. Pt would benefit from cont progression of core and hip strength in order to improve postural stability. Pt noted normal muscle soreness response post tx, denied any pain. []  See Progress Note/Recertification   Patient will continue to benefit from skilled PT services to modify and progress therapeutic interventions, address functional mobility deficits, address ROM deficits, address strength deficits, analyze and address soft tissue restrictions, analyze and cue movement patterns, analyze and modify body mechanics/ergonomics, assess and modify postural abnormalities, and instruct in home and community integration to attain remaining goals. Progress toward goals / Updated goals:  Short Term Goals:  To be accomplished in  3-4  weeks:  1. Patient will demonstrate compliance with HEP. MET  2. Patient will report 7/10 pain at worst to allow improved activity tolerance. Current: 8/9/22: at worst this week 7/10  3. Patient will demonstrate 4/5 B hip flexion strength to facilitate normal gait pattern. Current: 8/9/22: progressing: increased to [facility] for proximal hip strength w/ bridge, clams, side steps/fwd step  Long Term Goals: To be accomplished in  6-8  weeks:  1. Patient will demonstrate independence with HEP. 2. Patient will report 4/10 pain at worst to allow improved activity tolerance. 3. Patient will demonstrate 4/5 B hip extension strength to facilitate normal gait pattern. 4. Patient will score greater than or equal to 55/100 on FOTO, indicating improved function.      PLAN  [x]  Upgrade activities as tolerated yes Continue plan of care   []  Discharge due to :    [x]  Other: PN will be due at . Mallika Gorman 144 ( by 8/14), pt will see MD on 8/22/22     Therapist: Vladimir Saavedra, PT, DPT, CMPT    Date: 8/9/2022 Time: 1020      Future Appointments   Date Time Provider Brian Mascorro   8/9/2022  9:30 AM [address] [facility] [address] 1 [facility]   8/11/2022  9:30 AM Heather Calvo PTA [facility]   8/22/2022  8:45 AM Wali Robles MD [facility]   9/14/2022 10:30 AM Perlita Barlow MD [address]   9/28/2022  3:45 PM Perlita Barlow MD [address]

## 2022-08-11 ENCOUNTER — APPOINTMENT (OUTPATIENT)
Dept: PHYSICAL THERAPY | Age: 62
End: 2022-08-11
Payer: MEDICAID

## 2022-08-12 ENCOUNTER — HOSPITAL ENCOUNTER (OUTPATIENT)
Dept: PHYSICAL THERAPY | Age: 62
Discharge: HOME | End: 2022-08-12
Payer: MEDICAID

## 2022-08-12 PROCEDURE — 97530 THERAPEUTIC ACTIVITIES: CPT

## 2022-08-12 PROCEDURE — 97116 GAIT TRAINING THERAPY: CPT

## 2022-08-12 PROCEDURE — 97112 NEUROMUSCULAR REEDUCATION: CPT

## 2022-08-12 PROCEDURE — 97110 THERAPEUTIC EXERCISES: CPT

## 2022-08-12 NOTE — PROGRESS NOTES
PHYSICAL THERAPY - DAILY TREATMENT NOTE    Patient Name: Lady Morgan        Date: 2022  : 1960   yes Patient  Verified  Visit #:   9    Insurance: Payor: Hung Fan / Plan: 1 [facility] 270 / Product Type: Managed Care Medicaid /      In time:  Out time:    Total Treatment Time: 56       TREATMENT AREA =  Other low back pain [M54.59]    SUBJECTIVE  Pain Level (on 0 to 10 scale):  1 / 10   Medication Changes/New allergies or changes in medical history, any new surgeries or procedures?    no  If yes, update Summary List   Subjective Functional Status/Changes:  []  No changes reported     \"I feel the muscles when I am doing the HEP.  I need to keep up with them\"         OBJECTIVE  Modalities Rationale:     decrease inflammation and decrease pain to improve patient's ability to safely perform ADLs, bending/stooping/ lifting; perform prolong sitting/standing/ambulation; and negotiate stairs with no pain or limitations     10 min [x]  Ice     []  Heat    location/position: Supine with wedge under B LEs   [x]Skin assessment post-treatment (if applicable):   [x]  intact    []  redness- no adverse reaction                  []redness - adverse reaction:    e reaction:        14 min Therapeutic Exercise:  [x]  See flow sheet   Rationale:      increase ROM, increase strength, and improve coordination to improve the patients ability to  safely perform ADLs/transfers/squatting/prolong stding and ambulation/stair negotiation with minimal or no c/o pain     15 min Therapeutic Activity: [x]  See flow sheet   Rationale:    increase ROM, increase strength, and improve coordination to improve the patients ability to  safely perform ADLs/transfers/squatting/prolong stding and ambulation/stair negotiation with minimal or no c/o pain    10 min Neuromuscular Re-ed: [x]  See flow sheet   Rationale:    increase ROM, increase strength, and improve coordination to improve the patients ability to  safely perform ADLs/transfers/squatting/prolong stding and ambulation/stair negotiation with minimal or no c/o pain    12 min Gait Training:  (I) for HK amb x 60' with 10# KB  (I) SS x 30' x 2 with YTB  (I) with suitcase carry x 60' ea x 2 UE with 10#    Rationale: To improve ambulation safety and efficiency in order to improve patient's ability to safely ambulate at home for self care. Billed With/As:   [x] TE   [x] TA   [x] Neuro   [] Self Care Patient Education: [x] Review HEP    [] Progressed/Changed HEP based on:   [x] positioning   [x] body mechanics   [x] transfers   [] heat/ice application    [x] other: Pt ed on importance and benefits of compliance with HEP, core strength/stability and proper posture; pt verbalized understanding       Other Objective/Functional Measures:    VCs + demo to perform proper technique and for safety with TE  performed hip 3 way on AE with 1 UE assist   initiated lateral step ups and MIP on AE with no c/o p!; c/o mm soreness  no UE assist with MIP on AE     Post Treatment Pain Level (on 0 to 10) scale:  0 / 10     ASSESSMENT  Assessment/Changes in Function:   (I) with weighted GT   demos good stability with no LOB with AE TE  demos good quad control with step ups F/L on 6''   []  See Progress Note/Recertification   Patient will continue to benefit from skilled PT services to modify and progress therapeutic interventions, address functional mobility deficits, address ROM deficits, address strength deficits, analyze and address soft tissue restrictions, analyze and cue movement patterns, analyze and modify body mechanics/ergonomics, assess and modify postural abnormalities, and instruct in home and community integration to attain remaining goals. Progress toward goals / Updated goals:  Short Term Goals: To be accomplished in  3-4  weeks:  1. Patient will demonstrate compliance with HEP. MET  2.  Patient will report 7/10 pain at worst to allow improved activity tolerance. 3. Patient will demonstrate 4/5 B hip flexion strength to facilitate normal gait pattern. performing hip flex AROM on AE   Long Term Goals: To be accomplished in  6-8  weeks:  1. Patient will demonstrate independence with HEP. 2. Patient will report 4/10 pain at worst to allow improved activity tolerance. 3. Patient will demonstrate 4/5 B hip extension strength to facilitate normal gait pattern. 4. Patient will score greater than or equal to 55/100 on FOTO, indicating improved function.      PLAN  [x]  Upgrade activities as tolerated yes Continue plan of care   []  Discharge due to :    []  Other:      Therapist: Nicolette Calderon PTA    Date: 8/12/2022 Time: 12:53 PM     Future Appointments   Date Time Provider Brian Mascorro   8/15/2022  2:45 PM Maik Layton PT [facility]   8/22/2022  8:45 AM Doris Michelle MD [facility]   9/14/2022 10:30 AM MD Hardik Lepe   9/28/2022  3:45 PM MD Hardik Lepe

## 2022-09-07 ENCOUNTER — VIRTUAL VISIT (OUTPATIENT)
Dept: ORTHOPEDIC SURGERY | Age: 62
End: 2022-09-07
Payer: MEDICAID

## 2022-09-07 DIAGNOSIS — M54.41 CHRONIC BILATERAL LOW BACK PAIN WITH RIGHT-SIDED SCIATICA: ICD-10-CM

## 2022-09-07 DIAGNOSIS — G89.29 CHRONIC BILATERAL LOW BACK PAIN WITH RIGHT-SIDED SCIATICA: ICD-10-CM

## 2022-09-07 DIAGNOSIS — M54.41 CHRONIC BILATERAL LOW BACK PAIN WITH RIGHT-SIDED SCIATICA: Primary | ICD-10-CM

## 2022-09-07 DIAGNOSIS — G89.29 CHRONIC BILATERAL LOW BACK PAIN WITH RIGHT-SIDED SCIATICA: Primary | ICD-10-CM

## 2022-09-07 DIAGNOSIS — M54.2 NECK PAIN: ICD-10-CM

## 2022-09-07 PROCEDURE — 99442 PR PHYS/QHP TELEPHONE EVALUATION 11-20 MIN: CPT | Performed by: PHYSICAL MEDICINE & REHABILITATION

## 2022-09-07 NOTE — PROGRESS NOTES
[address]  [address]  [address]  Phone: [phone]  Fax: [phone]      Patient: Ritchie Paz                                                                              MRN: [id number]        YOB: 1960          AGE: 62 y.o. PCP: Marvin Matthew MD  Date:  09/07/22    Reason for Consultation: Back Pain      HPI:  Ritchie Paz is a 62 y.o. male with relevant PMH of prostate cancer (holistic treatment per patients request) , HTN who presented with low back pain. The pain began in 1997 after an injury carrying heavy material up stairs, felt pain in the right hip hip and weakness in the right leg. At the time did physical therapy. Over the years has had intermittent pain in the low back and right hip. 1.5 years ago pain worsened. He was seen by Dr. Amy Dickson and tried epidural injections which he found helpful. Since his last visit he has been doing chiropractic treatments as well as physical therapy. His pain is relatively unchanged per his report. Neurologic symptoms: No numbness, tingling, weakness, bowel or bladder changes. Reports a fall 1/2022. Ambulates with cane      Location: The pain is located in the low back, buttock, right lateral hip   Radiation: The pain does radiate down the right leg . Pain Score: Currently: 2/10  At Best: 0/10  At Worst: 10/10   Quality: Pain is of a Achy, Dull and Tight quality. Aggravating: Pain is exacerbated by  unclear  Alleviating: The pain is alleviated by lying down    Prior Treatments:  Physical therapy: Physical therapy - currently in PT  Chiropractic treatments  Injections:YES- Dr murry - series of 3 injections helped   Chiropractic treatment  Previous Medications: Aleve prn  Current Medications:  Previous work-up has included:   MRI cervical spine 2020  C1-2:  Within normal limits. C2-3:  Normal disc height and hydration. Minimal disc bulging. Mild right facet joint arthrosis. Anterior-posterior dimension of the thecal sac at the midline measures 11 mm. C3-4:  Mild disc narrowing and desiccation. Mild-moderate diffuse disc bulging with accompanying osteophytes. Severe bilateral uncovertebral joint hypertrophy. Anterior-posterior dimension of the thecal sac at the midline measures 11 mm. C4-5:  Normal disc height. Mild disc desiccation. Mild bilateral uncovertebral joint hypertrophy. Anterior-posterior dimension of the thecal sac at the midline measures 10.5 mm. C5-6:  Mild disc narrowing and desiccation. Mild left disc bulging with accompanying osteophytes. Mild right and moderate left uncovertebral joint hypertrophy. Moderate left neural foraminal stenosis. Anterior-posterior dimension of the thecal sac at the midline measures 10.5 mm.     C6-7:  Moderate disc narrowing and desiccation. Mild diffuse disc bulging. Anterior-posterior dimension of the thecal sac at the midline measures 11 mm. C7-T1:  Normal disc height and hydration. Moderate bilateral facet joint arthrosis with 1.5 mm anterior subluxation. Anterior-posterior dimension of the thecal sac at the midline measures 11 mm.    2/2021- x-ray hip right   Pelvis: No evidence of acute fracture of the pelvis. The sacroiliac joints are unremarkable. The pubic symphysis is unremarkable. No lytic or blastic focus identified. No erosions or periostitis appreciated. Hip: The femoral heads are well aligned with the osseous pelvis. No evidence of right hip fracture or dislocation. The joint spaces are unremarkable. 2/22/21- x-ray lumbar spine  VERTEBRAE AND ALIGNMENT: There is degenerative subluxation of L4 on L5 of approximately 0.9 cm     DISC SPACES: Disc space narrowing is present at L4-5 L2-3 and L1-2 severity is moderate. PARASPINOUS SOFT TISSUES: Unremarkable. ADDITIONAL: None.      IMPRESSION     Degenerative subluxation of L4 on 5 with multilevel degenerative disc dis  Bone scan 10/14/2020    There is increased radiotracer localization seen at the cervicothoracic junction as compared to previous study nonspecific although presumably is on the basis of disc degenerative disease. An MR examination of cervical spine is recommended to fully exclude the remote possibility of a metastatic process in this regard. Past Medical History:   Past Medical History:   Diagnosis Date    Elevated PSA     HTN (hypertension)     Orchitis and epididymitis     Prostate cancer ([facility] [address]     Smoker     Vitamin D deficiency       Past Surgical History:   Past Surgical History:   Procedure Laterality Date    HX HERNIA REPAIR      inguinal     HX LAPAROTOMY      removal of benign tumor in abdomen       SocHx:   Social History     Tobacco Use    Smoking status: Every Day     Packs/day: 0.50     Years: 20.00     Pack years: 10.00     Types: Cigarettes    Smokeless tobacco: Never   Substance Use Topics    Alcohol use: Yes     Alcohol/week: 6.0 standard drinks     Types: 6 Standard drinks or equivalent per week      FamHx:? Family History   Family history unknown: Yes       Current Medications:    Current Outpatient Medications   Medication Sig Dispense Refill    levoFLOXacin (Levaquin) 750 mg tablet Take one tablet two hours prior to your scheduled biopsy time. 1 Tablet 0    amLODIPine (NORVASC) 5 mg tablet         Allergies: Allergies   Allergen Reactions    Other Medication Shortness of Breath     Reports shortness of breath due to medication given prior to CT/MRI - unsure of name of medication          Medical Decision Making:    Images: The imaging results as well as the actual images of the studies below were reviewed, visualized and interpreted by me. Labs: The results below were reviewed.    Bone scan 2020- increased localization in cervicothoracic junction  MRI cervical spine 2020- multilevel DDD- no evidence of metastatic disease  Reviewed prior urology notes -  Mckenzie Cho regarding prostate cancer     Assessment:   -chronic low back pain    Plan:      -Physical therapy -  Continue Pt  -Medications - NA patient prefers to avoid medicatons. Counseled regarding side effects and appropriate administration of medications.    -Diagnostics/Imaging - Will get MRI lumbar spine given prostate cancer history   -Injections - Consider repeat epidural injection  -Lifestyle - Encouraged regular aerobic exercise   -Education - The patient's diagnosis, prognosis and treatment options were discussed today. All questions were answered. F/U - after MRI       [facility]    I was in the office while conducting this encounter. Patient at home    Consent:  He and/or his healthcare decision maker is aware that this patient-initiated Telehealth encounter is a billable service, with coverage as determined by his insurance carrier. He is aware that he may receive a bill and has provided verbal consent to proceed: Yes    This virtual visit was conducted telephone encounter only. -  I affirm this is a Patient Initiated Episode with an Established Patient who has not had a related appointment within my department in the past 7 days or scheduled within the next 24 hours. Note: this encounter is not billable if this call serves to triage the patient into an appointment for the relevant concern. Total Time: minutes: 11-20 minutes.

## 2022-10-04 ENCOUNTER — HOSPITAL ENCOUNTER (OUTPATIENT)
Dept: PHYSICAL THERAPY | Age: 62
Discharge: HOME | End: 2022-10-04
Payer: MEDICAID

## 2022-10-04 PROCEDURE — 97112 NEUROMUSCULAR REEDUCATION: CPT

## 2022-10-04 PROCEDURE — 97530 THERAPEUTIC ACTIVITIES: CPT

## 2022-10-04 PROCEDURE — 97116 GAIT TRAINING THERAPY: CPT

## 2022-10-04 PROCEDURE — 97110 THERAPEUTIC EXERCISES: CPT

## 2022-10-04 NOTE — PROGRESS NOTES
PHYSICAL THERAPY - DAILY TREATMENT NOTE    Patient Name: Daquan Chavez        Date: 10/4/2022  : 1960   yes Patient  Verified  Visit #:   10   of   22  Insurance: Payor: Natalie Cornejo / Plan: 1 [facility] 270 / Product Type: Managed Care Medicaid /      In time: 3968 Out time: 09   Total Treatment Time: 40       TREATMENT AREA =  Other low back pain [M54.59]    SUBJECTIVE  Pain Level (on 0 to 10 scale):  0  / 10   Medication Changes/New allergies or changes in medical history, any new surgeries or procedures?    no  If yes, update Summary List   Subjective Functional Status/Changes:  []  No changes reported     See PN     OBJECTIVE    10 min Therapeutic Exercise:  [x]  See flow sheet   Rationale:      increase ROM, increase strength, and improve coordination to improve the patients ability to safely perform ADLs, bending/stooping/ lifting; perform prolong sitting/standing/ambulation; and negotiate stairs with no pain or limitations      10 min Therapeutic Activity: [x]  See flow sheet   Rationale:    increase ROM, increase strength, and improve coordination to improve the patients ability to safely perform ADLs, bending/stooping/ lifting; perform prolong sitting/standing/ambulation; and negotiate stairs with no pain or limitations     10 min Neuromuscular Re-ed: [x]  See flow sheet   Rationale:    increase ROM, increase strength, and improve coordination to improve the patients ability to safely perform ADLs, bending/stooping/ lifting; perform prolong sitting/standing/ambulation; and negotiate stairs with no pain or limitations       10 min Gait Training:  sobia fwd/retro/lateral   Rationale: To improve ambulation safety and efficiency in order to improve patient's ability to safely ambulate at home for self care.         Billed With/As:   [x] TE   [x] TA   [x] Neuro   [] Self Care Patient Education: [x] Review HEP    [] Progressed/Changed HEP based on:   [x] positioning   [x] body mechanics   [x] transfers   [] heat/ice application    [x] other:Pt ed on importance and benefits of compliance with HEP, core strength/stability and proper posture; pt verbalized understanding          Other Objective/Functional Measures:  VCs + demo to perform proper technique and for safety with TE    Reviewed proper bed mobility, sleeping positions, lifting techniques and importance and benefits of a neutral spine    See PN     Post Treatment Pain Level (on 0 to 10) scale:   0  / 10     ASSESSMENT  Assessment/Changes in Function:     See PN     []  See Progress Note/Recertification   Patient will continue to benefit from skilled PT services to modify and progress therapeutic interventions, address functional mobility deficits, address ROM deficits, address strength deficits, analyze and address soft tissue restrictions, analyze and cue movement patterns, analyze and modify body mechanics/ergonomics, assess and modify postural abnormalities, and instruct in home and community integration to attain remaining goals. Progress toward goals / Updated goals:    See PN       PLAN  [x]  Upgrade activities as tolerated yes Continue plan of care   []  Discharge due to :    []  Other: Pt will benefit from further skilled PT services 2x wk x 4-6 wks to increase strength/stability and decrease sxs to promote functional mobility.        Therapist: Silviano Saeed PTA    Date: 10/4/2022 Time: 1:21 PM     Future Appointments   Date Time Provider Brian Mascorro   10/7/2022 10:15 AM Joellen Jones PT [facility]   10/12/2022  9:00 AM MD Hardik Melgar   10/26/2022  3:30 PM MD Hardik Melgar

## 2022-10-04 NOTE — PROGRESS NOTES
[address] [place] [address] PHYSICAL THERAPY  [address] [facility], [address] [facility] [address] - Phone: [phone]  Fax: [phone]  PROGRESS NOTE  Patient Name: Jose Peck : 1960   Treatment/Medical Diagnosis: Other low back pain [M54.59]   Referral Source: Carol Olson Provider #  [id number]   Date of Initial Visit: 22 Attended Visits: 10 Missed Visits: 1     SUMMARY OF TREATMENT  Patient's POC has consisted of therex, therapeutic activities, manual therapy prn, modalities prn, pt. education, and a comprehensive HEP. Treatment strategies used to address functional mobility deficits, ROM deficits, strength deficits, analyze and address soft tissue restrictions, analyze and cue movement patterns, analyze and modify body mechanics/ergonomics, assess and modify postural abnormalities and instruct in home and community integration. 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 Pt reports he was compliant daily with HEP and has initiated chiropractic care since [facility]. Pt reports 50% overall improvement since [facility], and is able to complete PT session without p! >1.5/10. Pt reports max p! 6/10 when rising in the morning. Pt's Hip strength MMT abd and extension left=4=/5, and right=5/5. (*See flex below). Hip  strength has improved grossly since [facility]. Pt reports that when traveling back from helping mother his pain is elevated for 3 days and has confidence that PT will help reduce and manage p!.    Goal/Measure of Progress Goal Met? 1. Patient will demonstrate compliance with HEP. Status at last Eval: Established HEP Current Status: compliant with HEP yes   2. Patient will report 7/10 pain at worst to allow improved activity tolerance   Status at last Eval: >7/10 Current Status: 6/10 yes   3. Patient will demonstrate 4/5 B hip flexion strength to facilitate normal gait pattern. Status at last Eval: 4-/5  Current Status: left 4-/5, right=4/5 partially achieved        New Goals to be achieved in __4__  weeks:  1. Patient will demonstrate independence with HEP. 2. Patient will report 4/10 pain at worst to allow improved activity tolerance. 3. Patient will demonstrate 5/5 left  hip extension strength to facilitate normal gait pattern. 4. Patient will score greater than or equal to 55/100 on FOTO, indicating improved function. Jonnie Everton RECOMMENDATIONS  Pt will benefit from further skilled PT services 2x wk x 4-6 wks to increase strength/stability and decrease sxs to promote functional mobility. If you have any questions/comments please contact us directly at [phone]. Thank you for allowing us to assist in the care of your patient. LPTA Signature: Angélica Nascimento PTA  Date: 10/4/2022   PT Signature: Roderick Villegas \"BJ\" Eugene Larose, DPSAVITA, Cert. MDT, Cert. DN, Cert. SMT, Dip. Osteopractic Time: 10:50 AM   NOTE TO PHYSICIAN:  PLEASE COMPLETE THE ORDERS BELOW AND FAX TO   [facility]: [phone]. If you are unable to process this request in 24 hours please contact our office: [phone].    ___ I have read the above report and request that my patient continue as recommended.   ___ I have read the above report and request that my patient continue therapy with the following changes/special instructions:_________________________________________________________   ___ I have read the above report and request that my patient be discharged from therapy.      Physician Signature:        Date:       Time:                                  William Mcgrath

## 2022-10-07 ENCOUNTER — HOSPITAL ENCOUNTER (OUTPATIENT)
Dept: PHYSICAL THERAPY | Age: 62
Discharge: HOME | End: 2022-10-07
Payer: MEDICAID

## 2022-10-07 PROCEDURE — 97112 NEUROMUSCULAR REEDUCATION: CPT

## 2022-10-07 PROCEDURE — 97110 THERAPEUTIC EXERCISES: CPT

## 2022-10-07 PROCEDURE — 97116 GAIT TRAINING THERAPY: CPT

## 2022-10-07 PROCEDURE — 97530 THERAPEUTIC ACTIVITIES: CPT

## 2022-10-07 NOTE — PROGRESS NOTES
PHYSICAL THERAPY - DAILY TREATMENT NOTE    Patient Name: David Zimmerman        Date: 10/7/2022  : 1960   YES Patient  Verified  Visit #:     Insurance: Payor: Jim Grajeda / Plan: 1 [place] [id number] / Product Type: Managed Care Medicaid /      In time: 10:18 Out time: 11:04   Total Treatment Time: 46     Medicare/BCBS [place] Time Tracking (below)   Total Timed Codes (min):  NA 1:1 Treatment Time:  NA     TREATMENT AREA =  Other low back pain [M54.59]  SUBJECTIVE    Pain Level (on 0 to 10 scale):  1-2  / 10   Medication Changes/New allergies or changes in medical history, any new surgeries or procedures? NO    If yes, update Summary List   Subjective Functional Status/Changes:  []  No changes reported     Pt reprots 1-2/10 pain and stiffness. Pt reports that he has been doing stretches at home. Pt reports that he feels like the sidestepping exercise with the band helps. Pt reports that he also thinks that see chiropractor has helped.           OBJECTIVE  18 min Therapeutic Exercise:  [x]  See flow sheet   Rationale:      increase ROM, increase strength, improve coordination, improve balance, and increase proprioception to improve the patients ability to perform ADLs/IADLs, functional mobility and gait safely and independently without increased pain/symptoms     12 min Therapeutic Activity: See flow sheet   Rationale: improve strength and balance with bed mobility, transfers, gait and stair negotiation to improve the patient's ability to perform ADLs/IADLs, functional mobility and gait safely and independently without increased pain/symptoms      8 min Neuromuscular Re-ed: See flow sheet   Rationale: improve core/glut activation and balance to improve the patient's ability to perform ADLs/IADLs, functional mobility and gait safely and independently without increased pain/symptoms      8 min Gait Training: See flow sheet   Rationale: improve gait stability to improve the patient's ability to perform ADLs/IADLs, functional mobility and gait safely and independently without increased pain/symptoms       During TE min Patient Education:  YES  Reviewed HEP   []  Progressed/Changed HEP based on:   Cont HEP     Other Objective/Functional Measures:    Exercises per flow sheet     Post Treatment Pain Level (on 0 to 10) scale:   1  / 10     ASSESSMENT    Assessment/Changes in Function:     Tolerated exercises without c/o increased pain     []  See Progress Note/Recertification   Patient will continue to benefit from skilled PT services to modify and progress therapeutic interventions, address functional mobility deficits, address ROM deficits, address strength deficits, analyze and address soft tissue restrictions, analyze and cue movement patterns, analyze and modify body mechanics/ergonomics, assess and modify postural abnormalities, address imbalance/dizziness, and instruct in home and community integration to attain remaining goals. Progress toward goals / Updated goals:    Progressing toward goals:  1. Patient will demonstrate independence with HEP. 2. Patient will report 4/10 pain at worst to allow improved activity tolerance. 3. Patient will demonstrate 5/5 left  hip extension strength to facilitate normal gait pattern. 4. Patient will score greater than or equal to 55/100 on FOTO, indicating improved function.        PLAN    [x]  Upgrade activities as tolerated YES Continue plan of care   []  Discharge due to :    []  Other:      Therapist: Leander Bond PT    Date: 10/7/2022 Time: 10:15 AM     Future Appointments   Date Time Provider Brian Mascorro   10/12/2022  9:00 AM Niecy Lomeli MD [address]   10/26/2022  3:30 PM Niecy Lomeli MD [address]

## 2022-10-11 ENCOUNTER — HOSPITAL ENCOUNTER (OUTPATIENT)
Dept: PHYSICAL THERAPY | Age: 62
Discharge: HOME | End: 2022-10-11
Payer: MEDICAID

## 2022-10-11 PROCEDURE — 97530 THERAPEUTIC ACTIVITIES: CPT

## 2022-10-11 PROCEDURE — 97110 THERAPEUTIC EXERCISES: CPT

## 2022-10-11 PROCEDURE — 97112 NEUROMUSCULAR REEDUCATION: CPT

## 2022-10-11 PROCEDURE — 97116 GAIT TRAINING THERAPY: CPT

## 2022-10-11 NOTE — PROGRESS NOTES
PHYSICAL THERAPY - DAILY TREATMENT NOTE    Patient Name: Dhruv Petty        Date: 10/11/2022  : 1960   YES Patient  Verified  Visit #:     Insurance: Payor: Beatriz Antonio / Plan: 1 [facility] 270 / Product Type: Managed Care Medicaid /      In time: 10:23 Out time: 11:05   Total Treatment Time: 42     Medicare/BCBS [place] Time Tracking (below)   Total Timed Codes (min):  NA 1:1 Treatment Time:  NA     TREATMENT AREA =  Other low back pain [M54.59]  SUBJECTIVE    Pain Level (on 0 to 10 scale):  2  / 10   Medication Changes/New allergies or changes in medical history, any new surgeries or procedures? NO    If yes, update Summary List   Subjective Functional Status/Changes:  []  No changes reported     Pt reports that he is getting better, reports that he continues to have intermittent pain and is not sure what causes it to come on.           OBJECTIVE  12 min Therapeutic Exercise:  [x]  See flow sheet   Rationale:      increase ROM, increase strength, improve coordination, improve balance, and increase proprioception to improve the patients ability to perform ADLs/IADLs, functional mobility and gait safely and independently without increased pain/symptoms     14 min Therapeutic Activity: See flow sheet   Rationale:   improve strength and balance with bed mobility, transfers, gait and stair negotiation to improve the patient's ability to perform ADLs/IADLs, functional mobility and gait safely and independently without increased pain/symptoms     8 min Neuromuscular Re-ed: See flow sheet   Rationale:  improve core/glut activation and balance to improve the patient's ability to perform ADLs/IADLs, functional mobility and gait safely and independently without increased pain/symptoms     8 min Gait Training: See flow sheet   Rationale:  improve gait stability to improve the patient's ability to perform ADLs/IADLs, functional mobility and gait safely and independently without increased pain/symptoms      T/o session min Patient Education:  YES  Reviewed HEP   []  Progressed/Changed HEP based on:   Cont HEP     Other Objective/Functional Measures:    Exercises per flow sheet - increased resistance leg press     Post Treatment Pain Level (on 0 to 10) scale:   0  / 10     ASSESSMENT    Assessment/Changes in Function:     Tolerated exercises with c/o muscle soreness only     []  See Progress Note/Recertification   Patient will continue to benefit from skilled PT services to modify and progress therapeutic interventions, address functional mobility deficits, address ROM deficits, address strength deficits, analyze and address soft tissue restrictions, analyze and cue movement patterns, analyze and modify body mechanics/ergonomics, assess and modify postural abnormalities, address imbalance/dizziness, and instruct in home and community integration to attain remaining goals. Progress toward goals / Updated goals:    Progressing toward goals:  1. Patient will demonstrate independence with HEP. 2. Patient will report 4/10 pain at worst to allow improved activity tolerance. 3. Patient will demonstrate 5/5 left  hip extension strength to facilitate normal gait pattern. - Bridge, step-ups, stand hip ext, sit to stand  4. Patient will score greater than or equal to 55/100 on FOTO, indicating improved function.         PLAN    [x]  Upgrade activities as tolerated YES Continue plan of care   []  Discharge due to :    []  Other:      Therapist: Zev Copeland PT    Date: 10/11/2022 Time: 10:26 AM     Future Appointments   Date Time Provider Brian Mascorro   10/12/2022  9:00 AM Carine Nick MD [address] [facility]   10/14/2022 11:00 AM Pamela Licona PT [facility]   10/18/2022 11:00 AM Carly Valle PTA [facility]   10/20/2022 11:00 AM Carly Valle PTA [facility]   10/24/2022 10:30 AM THE [facility] MRI RM 1 [facility] THE [facility]   10/24/2022  3:30 PM [facility]   10/26/2022  3:30 PM Miguel Dockery MD [address] [facility]   10/27/2022 10:15 AM Augusto Griggs PT [facility]

## 2022-10-14 ENCOUNTER — HOSPITAL ENCOUNTER (OUTPATIENT)
Dept: PHYSICAL THERAPY | Age: 62
Discharge: HOME | End: 2022-10-14
Payer: MEDICAID

## 2022-10-14 PROCEDURE — 97116 GAIT TRAINING THERAPY: CPT

## 2022-10-14 PROCEDURE — 97530 THERAPEUTIC ACTIVITIES: CPT

## 2022-10-14 PROCEDURE — 97110 THERAPEUTIC EXERCISES: CPT

## 2022-10-14 PROCEDURE — 97112 NEUROMUSCULAR REEDUCATION: CPT

## 2022-10-14 NOTE — PROGRESS NOTES
PHYSICAL THERAPY - DAILY TREATMENT NOTE    Patient Name: Jose Peck        Date: 10/14/2022  : 1960   YES Patient  Verified  Visit #:   15   of   18-22  Insurance: Payor: Nandini Tijerina / Plan: 1 [facility] 270 / Product Type: Managed Care Medicaid /      In time: 11:02 Out time: 11:48   Total Treatment Time: 46     Medicare/BCBS [place] Time Tracking (below)   Total Timed Codes (min):  NA 1:1 Treatment Time:  NA     TREATMENT AREA =  Other low back pain [M54.59]  SUBJECTIVE    Pain Level (on 0 to 10 scale):  0  / 10   Medication Changes/New allergies or changes in medical history, any new surgeries or procedures? NO    If yes, update Summary List   Subjective Functional Status/Changes:  []  No changes reported     Pt denies pain initially, reports discomfort right knee on bike which resolved with adjustment of seat to decrease knee flexion.           OBJECTIVE  20 min Therapeutic Exercise:  [x]  See flow sheet   Rationale:      increase ROM, increase strength, improve coordination, improve balance, and increase proprioception to improve the patients ability to perform ADLs/IADLs, functional mobility and gait safely and independently without increased pain/symptoms     12 min Therapeutic Activity: See flow sheet   Rationale: improve strength and balance with bed mobility, transfers, gait and stair negotiation to improve the patient's ability to perform ADLs/IADLs, functional mobility and gait safely and independently without increased pain/symptoms      8 min Neuromuscular Re-ed: See flow sheet   Rationale:  improve core/glut activation and balance to improve the patient's ability to perform ADLs/IADLs, functional mobility and gait safely and independently without increased pain/symptoms      8 min Gait Training: See flow sheet   Rationale: improve gait stability to improve the patient's ability to perform ADLs/IADLs, functional mobility and gait safely and independently without increased pain/symptoms       T/o session min Patient Education:  YES  Reviewed HEP   []  Progressed/Changed HEP based on:   Cont HEP     Other Objective/Functional Measures:    Exercises per flow sheet  Added hammer time     Post Treatment Pain Level (on 0 to 10) scale:   0  / 10     ASSESSMENT    Assessment/Changes in Function:     Tolerated exercises without complaints other than right knee discomfort on bike noted above     []  See Progress Note/Recertification   Patient will continue to benefit from skilled PT services to modify and progress therapeutic interventions, address functional mobility deficits, address ROM deficits, address strength deficits, analyze and address soft tissue restrictions, analyze and cue movement patterns, analyze and modify body mechanics/ergonomics, assess and modify postural abnormalities, address imbalance/dizziness, and instruct in home and community integration to attain remaining goals. Progress toward goals / Updated goals:    Progressing toward goals:  1. Patient will demonstrate independence with HEP. 2. Patient will report 4/10 pain at worst to allow improved activity tolerance. 3. Patient will demonstrate 5/5 left  hip extension strength to facilitate normal gait pattern. - Bridge, step-ups, stand hip ext, sit to stand  4. Patient will score greater than or equal to 55/100 on FOTO, indicating improved function.        PLAN    [x]  Upgrade activities as tolerated YES Continue plan of care   []  Discharge due to :    []  Other:      Therapist: Milad Vargas PT    Date: 10/14/2022 Time: 11:02 AM     Future Appointments   Date Time Provider Brian Mascorro   10/18/2022 11:00 AM [facility]   10/20/2022 11:[facility]   10/24/2022 10:30 AM THE [facility] RM 1 [facility]   10/24/2022  3:30 PM Ana [facility]   10/26/2022  3:30 PM Jamshid Munoz MD [facility] [facility]   10/27/2022 10:15 AM Keyana Marr, PT [facility]

## 2022-10-17 ENCOUNTER — TELEPHONE (OUTPATIENT)
Dept: ORTHOPEDIC SURGERY | Age: 62
End: 2022-10-17

## 2022-10-17 NOTE — TELEPHONE ENCOUNTER
Called # provided and left VM. Can we schedule him a follow up appointment after his MRI looks like scheduled 10/24/22, sometime after that    Thanks!

## 2022-10-17 NOTE — TELEPHONE ENCOUNTER
Patient says his chiropractor Dr. Boogie Romero says his neck issue is very severe and wants Dr. Rica Oconnell to take a look at his xrays at her earliest convenience so they can discuss how to move forward. .     Dr. Valentin Ao # [phone].

## 2022-10-18 ENCOUNTER — HOSPITAL ENCOUNTER (OUTPATIENT)
Dept: PHYSICAL THERAPY | Age: 62
Discharge: HOME | End: 2022-10-18
Payer: MEDICAID

## 2022-10-18 PROCEDURE — 97116 GAIT TRAINING THERAPY: CPT

## 2022-10-18 PROCEDURE — 97530 THERAPEUTIC ACTIVITIES: CPT

## 2022-10-18 PROCEDURE — 97110 THERAPEUTIC EXERCISES: CPT

## 2022-10-18 PROCEDURE — 97112 NEUROMUSCULAR REEDUCATION: CPT

## 2022-10-18 NOTE — PROGRESS NOTES
PHYSICAL THERAPY - DAILY TREATMENT NOTE    Patient Name: Jose Peck        Date: 10/18/2022  : 1960   yes Patient  Verified  Visit #:   15   of   22  Insurance: Payor: Nandini Tijerina / Plan: 1 [facility] 270 / Product Type: Managed Care Medicaid /      In time: 2458 Out time: 7396   Total Treatment Time: 54       TREATMENT AREA =  Other low back pain [M54.59]    SUBJECTIVE  Pain Level (on 0 to 10 scale):  0  / 10   Medication Changes/New allergies or changes in medical history, any new surgeries or procedures?    no  If yes, update Summary List   Subjective Functional Status/Changes:  []  No changes reported     \"I am so glad I came back and I was able to get in here and get stronger, I am getting stronger\"     OBJECTIVE    19 min Therapeutic Exercise:  [x]  See flow sheet   Rationale:      increase ROM, increase strength, and improve coordination to improve the patients ability to safely perform ADLs, bending/stooping/ lifting; perform prolong sitting/standing/ambulation; and negotiate stairs with no pain or limitations      15 min Therapeutic Activity: [x]  See flow sheet   Rationale:    increase ROM, increase strength, and improve coordination to improve the patients ability to safely perform ADLs, bending/stooping/ lifting; perform prolong sitting/standing/ambulation; and negotiate stairs with no pain or limitations     10 min Neuromuscular Re-ed: [x]  See flow sheet   Rationale:    increase ROM, increase strength, and improve coordination to improve the patients ability to safely perform ADLs, bending/stooping/ lifting; perform prolong sitting/standing/ambulation; and negotiate stairs with no pain or limitations     10 min Gait Training:  sobia fwd/retro/lateral   Rationale: To improve ambulation safety and efficiency in order to improve patient's ability to safely ambulate at home for self care.         Billed With/As:   [x] TE   [x] TA   [x] Neuro   [] Self Care Patient Education: [x] Review HEP    [] Progressed/Changed HEP based on:   [x] positioning   [x] body mechanics   [x] transfers   [] heat/ice application    [x] other:Pt ed on importance and benefits of compliance with HEP, core strength/stability and proper posture; pt verbalized understanding          Other Objective/Functional Measures:  VCs + demo to perform proper technique and for safety with TE  initiated bosu step ups, and YTB with hip 3 way;  no c/o pain noted at this time    required B UE assist with step ups on BOSU    performed HS str at steps      Post Treatment Pain Level (on 0 to 10) scale:   0  / 10     ASSESSMENT  Assessment/Changes in Function:   Progressed TE without c/o increase p!  (I) with GT  demos good quad control with BOSU step ups   []  See Progress Note/Recertification   Patient will continue to benefit from skilled PT services to modify and progress therapeutic interventions, address functional mobility deficits, address ROM deficits, address strength deficits, analyze and address soft tissue restrictions, analyze and cue movement patterns, analyze and modify body mechanics/ergonomics, assess and modify postural abnormalities, and instruct in home and community integration to attain remaining goals. New Goals to be achieved in __4__  weeks:  1. Patient will demonstrate independence with HEP. 2. Patient will report 4/10 pain at worst to allow improved activity tolerance. 3. Patient will demonstrate 5/5 left  hip extension strength to facilitate normal gait pattern. added YTB to hip ext to promote strength  4. Patient will score greater than or equal to 55/100 on FOTO, indicating improved function. Pinky Angles         PLAN  [x]  Upgrade activities as tolerated yes Continue plan of care   []  Discharge due to :    []  Other:      Therapist: Danica Palmer PTA    Date: 10/18/2022 Time: 2:05 PM     Future Appointments   Date Time Provider Brian Mascorro   10/20/2022 11:00 AM Sravanthi Jesica Ceja [facility]   10/24/2022 10:30 AM [facility] MRI RM 1 [facility]   10/24/2022  3:30 PM Brad Saldaña [facility]   10/26/2022  3:30 PM Miguel Dockery MD [address]   10/27/2022 10:15 AM Augusto Griggs PT [facility]   11/15/2022 10:30 AM Chani Mccormack MD [facility]

## 2022-10-20 ENCOUNTER — HOSPITAL ENCOUNTER (OUTPATIENT)
Dept: PHYSICAL THERAPY | Age: 62
Discharge: HOME | End: 2022-10-20
Payer: MEDICAID

## 2022-10-20 PROCEDURE — 97112 NEUROMUSCULAR REEDUCATION: CPT

## 2022-10-20 PROCEDURE — 97110 THERAPEUTIC EXERCISES: CPT

## 2022-10-20 PROCEDURE — 97530 THERAPEUTIC ACTIVITIES: CPT

## 2022-10-20 PROCEDURE — 97116 GAIT TRAINING THERAPY: CPT

## 2022-10-20 NOTE — PROGRESS NOTES
PHYSICAL THERAPY - DAILY TREATMENT NOTE    Patient Name: Sarah Roach        Date: 10/20/2022  : 1960   yes Patient  Verified  Visit #:     Insurance: Payor: Jason Brown / Plan: 1 [place] [id number] / Product Type: Managed Care Medicaid /      In time: 7194 Out time: 1146   Total Treatment Time: 57       TREATMENT AREA =  Other low back pain [M54.59]    SUBJECTIVE  Pain Level (on 0 to 10 scale):  0  / 10   Medication Changes/New allergies or changes in medical history, any new surgeries or procedures?    no  If yes, update Summary List   Subjective Functional Status/Changes:  []  No changes reported     \"I felt the soreness after last time, its good though\"     OBJECTIVE     22 min Therapeutic Exercise:  [x]  See flow sheet   Rationale:      increase ROM, increase strength, and improve coordination to improve the patients ability to safely perform ADLs, bending/stooping/ lifting; perform prolong sitting/standing/ambulation; and negotiate stairs with no pain or limitations      15 min Therapeutic Activity: [x]  See flow sheet   Rationale:    increase ROM, increase strength, and improve coordination to improve the patients ability to safely perform ADLs, bending/stooping/ lifting; perform prolong sitting/standing/ambulation; and negotiate stairs with no pain or limitations     10 min Neuromuscular Re-ed: [x]  See flow sheet   Rationale:    increase ROM, increase strength, and improve coordination to improve the patients ability to safely perform ADLs, bending/stooping/ lifting; perform prolong sitting/standing/ambulation; and negotiate stairs with no pain or limitations     10 min Gait Training:  sobia fwd/retro/lateral   Rationale: To improve ambulation safety and efficiency in order to improve patient's ability to safely ambulate at home for self care.       Billed With/As:   [x] TE   [x] TA   [x] Neuro   [] Self Care Patient Education: [x] Review HEP    [] Progressed/Changed HEP based on:   [x] positioning   [x] body mechanics   [x] transfers   [] heat/ice application    [x] other:Pt ed on importance and benefits of compliance with HEP, core strength/stability and proper posture; pt verbalized understanding   issued PTB for hip 3 way HEP        Other Objective/Functional Measures:  VCs + demo to perform proper technique and for safety with TE    initiated SL leg press at 30# with no c/o p!    attempted 1 UE assist with step ups on BOSU, pt unable to keep stability, continued with B UE safely      Post Treatment Pain Level (on 0 to 10) scale:   0  / 10     ASSESSMENT  Assessment/Changes in Function:   demos good control on leg press with SL    []  See Progress Note/Recertification   Patient will continue to benefit from skilled PT services to modify and progress therapeutic interventions, address functional mobility deficits, address ROM deficits, address strength deficits, analyze and address soft tissue restrictions, analyze and cue movement patterns, analyze and modify body mechanics/ergonomics, assess and modify postural abnormalities, and instruct in home and community integration to attain remaining goals. New Goals to be achieved in __4__  weeks:  1. Patient will demonstrate independence with HEP. 2. Patient will report 4/10 pain at worst to allow improved activity tolerance. 3. Patient will demonstrate 5/5 left  hip extension strength to facilitate normal gait pattern. added YTB to hip ext to promote strength  4. Patient will score greater than or equal to 55/100 on FOTO, indicating improved function. Marj Tenorio         PLAN  [x]  Upgrade activities as tolerated yes Continue plan of care   []  Discharge due to :    []  Other:      Therapist: Fernie Yeung PTA    Date: 10/20/2022 Time: 1:59 PM     Future Appointments   Date Time Provider Brian Mascorro   10/20/2022 11:00 AM Janis [facility] JORDYN [facility]   10/24/2022 10:30 AM [facility] MRI RM 1 [facility] 10/24/2022  3:30 PM Ro Gurrola [facility]   10/26/2022  3:30 PM Gabo Bowser MD [address]   10/27/2022 10:15 AM Néstor Mayo PT [facility]   11/15/2022 10:30 AM Froylan Claire MD [facility]

## 2022-10-21 ENCOUNTER — TELEPHONE (OUTPATIENT)
Dept: ORTHOPEDIC SURGERY | Age: 62
End: 2022-10-21

## 2022-10-21 NOTE — TELEPHONE ENCOUNTER
Patient would like to have a new MRI Cervical order put in. At his last office visit the focus was on his low back. Would you like to put in a new order for it or evaluate the patient at his next appointment for documentation purposes. Patient is scheduled to have his MRI Lumbar spine 10/24/2022 and his follow up is 11/15/2022. Please advise.

## 2022-10-21 NOTE — TELEPHONE ENCOUNTER
I spoke to him and discussed the MRI. He is going to have MRI lumbars spine. Can we get home scheduled for in person appt 10/27/22 to review and then we can evaluate lumbar spine.  Ok to double book at 11:30 if needed  Thanks

## 2022-10-21 NOTE — TELEPHONE ENCOUNTER
Called patient [phone] and left voice mail asking patient to call our office back. I was calling to offer him an appointment for next Thursday 10/27/2022 at 1130 am with Dr. Meghana Bernabe at our [place] office per the doctor. If patient is okay with that. Please make the appointment.  Thank you

## 2022-10-21 NOTE — TELEPHONE ENCOUNTER
Called patient and used the two identifiers. Communicated to patient that Dr. Jadyn Sweet add neck MRI because need to have prior auth.

## 2022-10-21 NOTE — TELEPHONE ENCOUNTER
Patient called to follow up on request below with [facility] on the line, explained process of adding neck to MRI order would require prior auth and would more than likely not be done prior to MRI appointment scheduled for Monday. Patient is now requesting that we write a separate MRI order for his neck to be done on a future date.

## 2022-10-21 NOTE — TELEPHONE ENCOUNTER
Patient called in and is requesting for the neck to be added to his MRI order. His appt for the MRI is Monday 10/24/2022 at 10:30 am. Patient can be reached at [phone].

## 2022-10-24 ENCOUNTER — APPOINTMENT (OUTPATIENT)
Dept: PHYSICAL THERAPY | Age: 62
End: 2022-10-24
Payer: MEDICAID

## 2022-10-24 ENCOUNTER — HOSPITAL ENCOUNTER (OUTPATIENT)
Dept: MRI IMAGING | Age: 62
Discharge: HOME | End: 2022-10-24
Attending: PHYSICAL MEDICINE & REHABILITATION
Payer: MEDICAID

## 2022-10-24 DIAGNOSIS — G89.29 CHRONIC BILATERAL LOW BACK PAIN WITH RIGHT-SIDED SCIATICA: ICD-10-CM

## 2022-10-24 DIAGNOSIS — M54.41 CHRONIC BILATERAL LOW BACK PAIN WITH RIGHT-SIDED SCIATICA: ICD-10-CM

## 2022-10-24 PROCEDURE — 72148 MRI LUMBAR SPINE W/O DYE: CPT

## 2022-10-27 ENCOUNTER — OFFICE VISIT (OUTPATIENT)
Dept: ORTHOPEDIC SURGERY | Age: 62
End: 2022-10-27
Payer: MEDICAID

## 2022-10-27 ENCOUNTER — APPOINTMENT (OUTPATIENT)
Dept: PHYSICAL THERAPY | Age: 62
End: 2022-10-27
Payer: MEDICAID

## 2022-10-27 VITALS — HEIGHT: 67 IN | WEIGHT: 146 LBS | BODY MASS INDEX: 22.91 KG/M2

## 2022-10-27 DIAGNOSIS — M43.16 SPONDYLOLISTHESIS OF LUMBAR REGION: ICD-10-CM

## 2022-10-27 DIAGNOSIS — M54.41 CHRONIC BILATERAL LOW BACK PAIN WITH BILATERAL SCIATICA: ICD-10-CM

## 2022-10-27 DIAGNOSIS — M54.41 CHRONIC BILATERAL LOW BACK PAIN WITH RIGHT-SIDED SCIATICA: Primary | ICD-10-CM

## 2022-10-27 DIAGNOSIS — M54.2 NECK PAIN: ICD-10-CM

## 2022-10-27 DIAGNOSIS — M47.816 FACET ARTHRITIS, DEGENERATIVE, LUMBAR SPINE: ICD-10-CM

## 2022-10-27 DIAGNOSIS — G89.29 CHRONIC BILATERAL LOW BACK PAIN WITH RIGHT-SIDED SCIATICA: Primary | ICD-10-CM

## 2022-10-27 DIAGNOSIS — M54.42 CHRONIC BILATERAL LOW BACK PAIN WITH BILATERAL SCIATICA: ICD-10-CM

## 2022-10-27 DIAGNOSIS — G89.29 CHRONIC BILATERAL LOW BACK PAIN WITH BILATERAL SCIATICA: ICD-10-CM

## 2022-10-27 PROCEDURE — 99214 OFFICE O/P EST MOD 30 MIN: CPT | Performed by: PHYSICAL MEDICINE & REHABILITATION

## 2022-10-27 NOTE — Clinical Note
Hi, I have been seeing Sreekanth Guillermo for his low back pain. We got an MRI of his lumbar spine which incidentally noted - \"Bladder wall thickening, which can be seen in the setting of outlet obstruction or superimposed infection, with prostate indenting the bladder base, best evaluated clinically. \" I believe he has an appointment with you coming up and I was not sure if he needed any additional work up. Thanks!  [employer]

## 2022-10-27 NOTE — PROGRESS NOTES
[address]  [address]  [address]  Phone: [phone]  Fax: [phone]      Patient: Leanne Beach                                                                              MRN: [id number]        YOB: 1960          AGE: 62 y.o. PCP: Ekaterina Alexander MD  Date:  10/27/22    Reason for Consultation: Back Pain and Neck Pain      HPI:  Leanne Beach is a 62 y.o. male with relevant PMH of prostate cancer (holistic treatment per patients request) , HTN who presented with low back pain. The pain began in 1997 after an injury carrying heavy material up stairs, felt pain in the right hip hip and weakness in the right leg. At the time did physical therapy. Over the years has had intermittent pain in the low back and right hip. 1.5 years ago pain worsened. He was seen by Dr. Yahaira Skinner and tried epidural injections which he found helpful. Since his last visit he has been doing chiropractic treatments as well as physical therapy. His pain is relatively unchanged per his report. We got an MRI of his lumbar spine 10/24/22 which demonstrates  l4/5 anterolisthesis with broad based disc bulge, facet arthritis, left facet cyst, lateral recess and severe  foraminal narrowing. Neurologic symptoms: No numbness, tingling, weakness, bowel or bladder changes. Reports a fall 1/2022. Ambulates with cane      Location: The pain is located in the low back, buttock, right lateral hip   Radiation: The pain does radiate down the right leg . Pain Score: Currently: 3/10  At Best: 0/10  At Worst: 10/10   Quality: Pain is of a Achy, Dull and Tight quality. Aggravating: Pain is exacerbated by  unclear  Alleviating:  The pain is alleviated by lying down    Prior Treatments:  Physical therapy: Physical therapy - currently in PT  Chiropractic treatments  Injections:YES- Dr murry - series of 3 injections helped   Chiropractic treatment  Previous Medications: Aleve prn  Current Medications:  Previous work-up has included:   MRI lumbar spine 10/24/22  L1/2: Mild retrolisthesis L1 on L2, disc space narrowing and broad-based dis bulge. No limiting central or foraminal stenosis. L2/3: Mild disc bulge slightly indenting into the ventral thecal sac. Mild facet and ligamentous hypertrophy. No limiting central or foraminal stenosis. L3/4: Mild disc bulge, facet and ligamentous hypertrophy. No limiting central or foraminal stenosis. L4/5: 0.67 0.7 cm anterolisthesis of L4 on L5, broad-based disc protrusion, punctate left foraminal annular tear and marked posterior hypertrophic changes. Left facet subarticular and laterally projecting cysts without contact or compromise of exiting or intrathecal roots. There is narrowing of the transverse diameter of the thecal sac and severe superior L5 lateral recess narrowing which may compromise the left more than right descending root. There is mild narrowing  of the AP diameter thecal sac (0.8 cm). Foraminal disc material extends into the elongated foramina bilaterally, with relatively severe foraminal narrowing, potentially compromising the exiting L4 roots. L5/S1: Mild central disc protrusion and annular tear, mild ventral impression on the thecal sac as the S1 roots descend. No contact or compromise of the foraminal L5 roots. MRI cervical spine 2020  C1-2:  Within normal limits. C2-3:  Normal disc height and hydration. Minimal disc bulging. Mild right facet joint arthrosis. Anterior-posterior dimension of the thecal sac at the midline measures 11 mm. C3-4:  Mild disc narrowing and desiccation. Mild-moderate diffuse disc bulging with accompanying osteophytes. Severe bilateral uncovertebral joint hypertrophy. Anterior-posterior dimension of the thecal sac at the midline measures 11 mm. C4-5:  Normal disc height. Mild disc desiccation.   Mild bilateral uncovertebral joint hypertrophy. Anterior-posterior dimension of the thecal sac at the midline measures 10.5 mm. C5-6:  Mild disc narrowing and desiccation. Mild left disc bulging with accompanying osteophytes. Mild right and moderate left uncovertebral joint hypertrophy. Moderate left neural foraminal stenosis. Anterior-posterior dimension of the thecal sac at the midline measures 10.5 mm.     C6-7:  Moderate disc narrowing and desiccation. Mild diffuse disc bulging. Anterior-posterior dimension of the thecal sac at the midline measures 11 mm. C7-T1:  Normal disc height and hydration. Moderate bilateral facet joint arthrosis with 1.5 mm anterior subluxation. Anterior-posterior dimension of the thecal sac at the midline measures 11 mm.    2/2021- x-ray hip right   Pelvis: No evidence of acute fracture of the pelvis. The sacroiliac joints are unremarkable. The pubic symphysis is unremarkable. No lytic or blastic focus identified. No erosions or periostitis appreciated. Hip: The femoral heads are well aligned with the osseous pelvis. No evidence of right hip fracture or dislocation. The joint spaces are unremarkable. 2/22/21- x-ray lumbar spine  VERTEBRAE AND ALIGNMENT: There is degenerative subluxation of L4 on L5 of approximately 0.9 cm     DISC SPACES: Disc space narrowing is present at L4-5 L2-3 and L1-2 severity is moderate. PARASPINOUS SOFT TISSUES: Unremarkable. ADDITIONAL: None. IMPRESSION     Degenerative subluxation of L4 on 5 with multilevel degenerative disc dis  Bone scan 10/14/2020    There is increased radiotracer localization seen at the cervicothoracic junction as compared to previous study nonspecific although presumably is on the basis of disc degenerative disease. An MR examination of cervical spine is recommended to fully exclude the remote possibility of a metastatic process in this regard.         Past Medical History:   Past Medical History:   Diagnosis Date    Elevated PSA HTN (hypertension)     Orchitis and epididymitis     Prostate cancer ([facility] [address]     Smoker     Vitamin D deficiency       Past Surgical History:   Past Surgical History:   Procedure Laterality Date    HX HERNIA REPAIR      inguinal     HX LAPAROTOMY      removal of benign tumor in abdomen       SocHx:   Social History     Tobacco Use    Smoking status: Every Day     Packs/day: 0.50     Years: 20.00     Pack years: 10.00     Types: Cigarettes    Smokeless tobacco: Never   Substance Use Topics    Alcohol use: Yes     Alcohol/week: 6.0 standard drinks     Types: 6 Standard drinks or equivalent per week      FamHx:? Family History   Family history unknown: Yes       Current Medications:    Current Outpatient Medications   Medication Sig Dispense Refill    amoxicillin (AMOXIL) 500 mg capsule  (Patient not taking: Reported on 10/26/2022)      amLODIPine (NORVASC) 5 mg tablet         Allergies: Allergies   Allergen Reactions    Other Medication Shortness of Breath     Reports shortness of breath due to medication given prior to CT/MRI - unsure of name of medication      General: ??????? Well nourished and well developed male without any acute distress   Psychiatric: ?  Alert and oriented x 3 with normal mood    HEENT: ???????? Atraumatic   Respiratory:   Breathing non-labored and non dyspneic   CV: ???????????????? Peripheral pulses intact, no peripheral edema   Skin: ????????????? No rashes         Neurologic: ??        Sensation: normal and grossly intact thebilateral, upper and  lower extremity(s)  Strength: 5/5 in the bilateral, upper and lower extremity(s)   Reflexes: reveals 2+ symmetric DTRs throughout  LE  Gait: normal     Musculoskeletal: Lumbar Exam      Inspection:   Alignment: Normal  Atrophy: None         Tenderness to Palpation:   Lumbar paraspinals Positive  Lumbar spinous processes Negative  SI Joint:  Positive  Gluteal:Negative   Greater trochanter: Negative        ROM:   Lumbar ROM: Normal  Lumbar facet loading: Negative  Hip ROM: No reproduction of pain with movement      Special Tests        Slump test: Negative  SLR: Negative  HARRIETT: Negative  FADIR: Negative  Scour:  Negative  Stinchfield: Negative  Log Roll: Negative  SI joint compression: Negative    Medical Decision Making:    Images: The imaging results as well as the actual images of the studies below were reviewed, visualized and interpreted by me. Labs: The results below were reviewed. MRI lumbar spine 10/24/22 reviewed as above    Assessment:   -L4/5 anterolisthesis b/l foraminal and lateral recess narrowing  - cervical spondylosis    Plan:      -Physical therapy -  Continue Pt exercises  -Medications - NA patient prefers to avoid medicatons. Counseled regarding side effects and appropriate administration of medications.    -Diagnostics/Imaging - MRI lumbar spine reviewed  -Injections - Consider repeat epidural injection- he is going to consider it and if he wants to proceed will let me know and I will place referral for Dr. Leeann Hilario  -He is going to follow up with PCP regarding findings on MRI of bladder wall thickening  -Lifestyle - Encouraged regular aerobic exercise   -Education - The patient's diagnosis, prognosis and treatment options were discussed today. All questions were answered. F/U in 3 months recheck strength. Full on exam today but he reports at times he feels weakness in right leg/ankle       [facility]          Total time spent with patient:  30 mins for today's visit was devoted to face-to-face counseling regarding the following:  Discussed diagnosis, treatment options, and risks and benefits of treatment          ?

## 2023-01-03 NOTE — PROGRESS NOTES
Dear / FNP/ PA: Booker Mars*, under your direction, we have been providing physical therapy for your patient [place], 1960 for a diagnosis of Other low back pain [M54.59]. The patient attended 15 visits total, and 6 visits after lapse in PT from 8/12/22-10/4/22. Pt missed 2 visits. Dionisio Cullen was last seen on 10/20/22. On the last visit they reported 0/10 p! level, and stated, \"I felt the soreness after last time, its good though\". Due to the inability to further their care from non-attendance, we are discharging the patient from physical therapy at this time. We appreciate the kind referral and would willingly work with this patient again, should they be able to arrange regular attendance. Your patient's health is our primary concern. Should you have any further questions or concerns, please feel free to contact me at your convenience. Sincerely,     Supriya Morales PTA                   1/3/2023        LPTA Signature: Supriya Morales PTA Date: 10/20/22 *late entry   Therapist Signature: KEN Pérez Time: 10:14 AM           NOTE TO PHYSICIAN:  Lizzeth Lewis 172 FAX TO   [facility]: [phone]. If you are unable to process this request in 24 hours please contact our office: [phone].    ___ I have read the above report and request that my patient continue as recommended.   ___ I have read the above report and request that my patient continue therapy with the following changes/special instructions:_________________________________________________________   ___ I have read the above report and request that my patient be discharged from therapy. Physician Signature:        Date:       Time:              Booker Mars*.